# Patient Record
Sex: MALE | Race: WHITE | Employment: PART TIME | URBAN - METROPOLITAN AREA
[De-identification: names, ages, dates, MRNs, and addresses within clinical notes are randomized per-mention and may not be internally consistent; named-entity substitution may affect disease eponyms.]

---

## 2017-01-03 ENCOUNTER — OFFICE VISIT (OUTPATIENT)
Dept: FAMILY MEDICINE CLINIC | Facility: CLINIC | Age: 57
End: 2017-01-03

## 2017-01-03 VITALS
BODY MASS INDEX: 24.55 KG/M2 | TEMPERATURE: 99 F | HEIGHT: 68 IN | WEIGHT: 162 LBS | SYSTOLIC BLOOD PRESSURE: 114 MMHG | RESPIRATION RATE: 22 BRPM | DIASTOLIC BLOOD PRESSURE: 79 MMHG | HEART RATE: 85 BPM

## 2017-01-03 DIAGNOSIS — J06.9 ACUTE URI: ICD-10-CM

## 2017-01-03 PROCEDURE — 99213 OFFICE O/P EST LOW 20 MIN: CPT | Performed by: FAMILY MEDICINE

## 2017-01-03 PROCEDURE — 99212 OFFICE O/P EST SF 10 MIN: CPT | Performed by: FAMILY MEDICINE

## 2017-01-03 RX ORDER — AMOXICILLIN AND CLAVULANATE POTASSIUM 875; 125 MG/1; MG/1
1 TABLET, FILM COATED ORAL 2 TIMES DAILY
Qty: 20 TABLET | Refills: 0 | Status: SHIPPED | OUTPATIENT
Start: 2017-01-03 | End: 2017-03-01

## 2017-01-03 NOTE — PROGRESS NOTES
HPI:    Patient ID: Gerardo Rodrigues is a 55year old male. HPI Comments: Pt presents with cold symptoms for 5 days. Pt has had cough, sore throat. had fevers. Pt has tried otc remedies without relief. Several sick contacts.       Nasal Congestion  This is route daily. Disp: 1 kit Rfl: 0   Vitamins-Lipotropics (MULTI-VITAMIN HP/MINERALS) Oral Cap Take  by mouth.  Disp:  Rfl:      Allergies:  Ciprofloxacin           Hives   PHYSICAL EXAM:   Physical Exam   Constitutional: He appears well-developed and well-nou

## 2017-01-09 ENCOUNTER — OFFICE VISIT (OUTPATIENT)
Dept: SURGERY | Facility: CLINIC | Age: 57
End: 2017-01-09

## 2017-01-09 ENCOUNTER — TELEPHONE (OUTPATIENT)
Dept: FAMILY MEDICINE CLINIC | Facility: CLINIC | Age: 57
End: 2017-01-09

## 2017-01-09 DIAGNOSIS — R10.31 RIGHT INGUINAL PAIN: Primary | ICD-10-CM

## 2017-01-09 DIAGNOSIS — Q44.1 GALLBLADDER ANOMALY: Primary | ICD-10-CM

## 2017-01-09 PROCEDURE — 99213 OFFICE O/P EST LOW 20 MIN: CPT | Performed by: SURGERY

## 2017-01-09 PROCEDURE — 99212 OFFICE O/P EST SF 10 MIN: CPT | Performed by: SURGERY

## 2017-01-09 NOTE — TELEPHONE ENCOUNTER
Patient seen Dr. Deneen Stevens and was told to call Dr Crys Haider re: Melissa Blackman Bladder and 6 mo follow up to see if he should have another US  It has bee six months.

## 2017-01-09 NOTE — TELEPHONE ENCOUNTER
Message noted/ chart reviewed and order for repeat u/s for gallbladder generated and signed. Can inform patient to schedule. COPY OF order left at  also.

## 2017-01-09 NOTE — PROGRESS NOTES
Established Patient Follow-up      1/9/2017    Arina Luis 55year old      HPI  Patient presents with: Follow - Up: Patient s/p right inguinal hernia repair with mesh on 08/17/16.   Patient states has been having intermittent RUQ pain, denies nausea, v

## 2017-01-17 RX ORDER — FLUTICASONE PROPIONATE 50 MCG
SPRAY, SUSPENSION (ML) NASAL
Qty: 1 INHALER | Refills: 5 | Status: SHIPPED | OUTPATIENT
Start: 2017-01-17 | End: 2017-10-11

## 2017-01-17 NOTE — TELEPHONE ENCOUNTER
Message noted: Chart reviewed and may refill medication as requested times one with 5 additional refills. Prescription sent to listed pharmacy. Pharmacy to notify patient.

## 2017-01-18 ENCOUNTER — TELEPHONE (OUTPATIENT)
Dept: FAMILY MEDICINE CLINIC | Facility: CLINIC | Age: 57
End: 2017-01-18

## 2017-01-18 NOTE — TELEPHONE ENCOUNTER
Pt is calling state that his insurance is not covering medication FLUTICASONE PROPIONATE 50 MCG/ACT Nasal Suspension  Pt want to know if there is another medication that Dr TEJADA can prescribe

## 2017-01-19 ENCOUNTER — HOSPITAL ENCOUNTER (OUTPATIENT)
Dept: ULTRASOUND IMAGING | Facility: HOSPITAL | Age: 57
Discharge: HOME | End: 2017-01-19
Attending: FAMILY MEDICINE
Payer: MEDICAID

## 2017-01-19 DIAGNOSIS — Q44.1 GALLBLADDER ANOMALY: ICD-10-CM

## 2017-01-19 PROCEDURE — 76700 US EXAM ABDOM COMPLETE: CPT

## 2017-01-23 ENCOUNTER — TELEPHONE (OUTPATIENT)
Dept: SURGERY | Facility: CLINIC | Age: 57
End: 2017-01-23

## 2017-01-23 NOTE — TELEPHONE ENCOUNTER
As stated in my recent note from 1/9/17, I did not think his gallstones were causing any symptoms.  No polyps were seen. Haley Dos Santos he remains concerned, then f/u in my office in 1-2 months to review options again.          ----- Message -----          From:  Wa

## 2017-02-01 ENCOUNTER — NURSE ONLY (OUTPATIENT)
Dept: ALLERGY | Facility: CLINIC | Age: 57
End: 2017-02-01

## 2017-02-01 DIAGNOSIS — J30.89 ENVIRONMENTAL AND SEASONAL ALLERGIES: Primary | ICD-10-CM

## 2017-02-01 PROCEDURE — 95125 IMMUNOTHERAPY 2/> INJECTIONS: CPT | Performed by: ALLERGY & IMMUNOLOGY

## 2017-02-14 ENCOUNTER — NURSE ONLY (OUTPATIENT)
Dept: ALLERGY | Facility: CLINIC | Age: 57
End: 2017-02-14

## 2017-02-14 DIAGNOSIS — Z91.09 ENVIRONMENTAL ALLERGIES: Primary | ICD-10-CM

## 2017-02-14 PROCEDURE — 95125 IMMUNOTHERAPY 2/> INJECTIONS: CPT | Performed by: ALLERGY & IMMUNOLOGY

## 2017-03-01 ENCOUNTER — OFFICE VISIT (OUTPATIENT)
Dept: FAMILY MEDICINE CLINIC | Facility: CLINIC | Age: 57
End: 2017-03-01

## 2017-03-01 ENCOUNTER — APPOINTMENT (OUTPATIENT)
Dept: LAB | Age: 57
End: 2017-03-01
Attending: FAMILY MEDICINE
Payer: MEDICAID

## 2017-03-01 VITALS
WEIGHT: 156 LBS | HEART RATE: 70 BPM | BODY MASS INDEX: 23.64 KG/M2 | DIASTOLIC BLOOD PRESSURE: 80 MMHG | RESPIRATION RATE: 20 BRPM | HEIGHT: 68 IN | TEMPERATURE: 99 F | SYSTOLIC BLOOD PRESSURE: 115 MMHG

## 2017-03-01 DIAGNOSIS — Z00.00 ROUTINE PHYSICAL EXAMINATION: Primary | ICD-10-CM

## 2017-03-01 DIAGNOSIS — E11.9 CONTROLLED TYPE 2 DIABETES MELLITUS WITHOUT COMPLICATION, WITHOUT LONG-TERM CURRENT USE OF INSULIN (HCC): ICD-10-CM

## 2017-03-01 DIAGNOSIS — Z00.00 ROUTINE PHYSICAL EXAMINATION: ICD-10-CM

## 2017-03-01 LAB
ALBUMIN SERPL BCP-MCNC: 4 G/DL (ref 3.5–4.8)
ALBUMIN/GLOB SERPL: 1.4 {RATIO} (ref 1–2)
ALP SERPL-CCNC: 42 U/L (ref 32–100)
ALT SERPL-CCNC: 24 U/L (ref 17–63)
ANION GAP SERPL CALC-SCNC: 9 MMOL/L (ref 0–18)
AST SERPL-CCNC: 16 U/L (ref 15–41)
BILIRUB SERPL-MCNC: 1 MG/DL (ref 0.3–1.2)
BUN SERPL-MCNC: 16 MG/DL (ref 8–20)
BUN/CREAT SERPL: 20.3 (ref 10–20)
CALCIUM SERPL-MCNC: 9.1 MG/DL (ref 8.5–10.5)
CHLORIDE SERPL-SCNC: 102 MMOL/L (ref 95–110)
CHOLEST SERPL-MCNC: 163 MG/DL (ref 110–200)
CO2 SERPL-SCNC: 30 MMOL/L (ref 22–32)
CREAT SERPL-MCNC: 0.79 MG/DL (ref 0.5–1.5)
CREAT UR-MCNC: 204.6 MG/DL
ERYTHROCYTE [DISTWIDTH] IN BLOOD BY AUTOMATED COUNT: 13.1 % (ref 11–15)
GLOBULIN PLAS-MCNC: 2.8 G/DL (ref 2.5–3.7)
GLUCOSE SERPL-MCNC: 175 MG/DL (ref 70–99)
HBA1C MFR BLD: 6.4 % (ref 4–6)
HCT VFR BLD AUTO: 49.4 % (ref 41–52)
HDLC SERPL-MCNC: 43 MG/DL
HGB BLD-MCNC: 16.4 G/DL (ref 13.5–17.5)
LDLC SERPL CALC-MCNC: 113 MG/DL (ref 0–99)
MCH RBC QN AUTO: 31 PG (ref 27–32)
MCHC RBC AUTO-ENTMCNC: 33.1 G/DL (ref 32–37)
MCV RBC AUTO: 93.6 FL (ref 80–100)
MICROALBUMIN UR-MCNC: 0.3 MG/DL (ref 0–1.8)
MICROALBUMIN/CREAT UR: 1.5 MG/G{CREAT} (ref 0–20)
NONHDLC SERPL-MCNC: 120 MG/DL
OSMOLALITY UR CALC.SUM OF ELEC: 297 MOSM/KG (ref 275–295)
PLATELET # BLD AUTO: 186 K/UL (ref 140–400)
PMV BLD AUTO: 9 FL (ref 7.4–10.3)
POTASSIUM SERPL-SCNC: 4.5 MMOL/L (ref 3.3–5.1)
PROT SERPL-MCNC: 6.8 G/DL (ref 5.9–8.4)
PSA SERPL-MCNC: 0.5 NG/ML (ref 0–4)
RBC # BLD AUTO: 5.28 M/UL (ref 4.5–5.9)
SODIUM SERPL-SCNC: 141 MMOL/L (ref 136–144)
TRIGL SERPL-MCNC: 36 MG/DL (ref 1–149)
WBC # BLD AUTO: 5.8 K/UL (ref 4–11)

## 2017-03-01 PROCEDURE — 36415 COLL VENOUS BLD VENIPUNCTURE: CPT

## 2017-03-01 PROCEDURE — 85027 COMPLETE CBC AUTOMATED: CPT

## 2017-03-01 PROCEDURE — 82570 ASSAY OF URINE CREATININE: CPT

## 2017-03-01 PROCEDURE — 80061 LIPID PANEL: CPT

## 2017-03-01 PROCEDURE — 83036 HEMOGLOBIN GLYCOSYLATED A1C: CPT

## 2017-03-01 PROCEDURE — 99396 PREV VISIT EST AGE 40-64: CPT | Performed by: FAMILY MEDICINE

## 2017-03-01 PROCEDURE — 82043 UR ALBUMIN QUANTITATIVE: CPT

## 2017-03-01 PROCEDURE — 80053 COMPREHEN METABOLIC PANEL: CPT

## 2017-03-01 NOTE — PROGRESS NOTES
HPI:    Patient ID: Zack Mcclellan is a 55year old male. HPI Comments: Patient is here for routine physical exam. No acute issues. Patient is requesting blood testing. Diet and exercise have been fair.  Past medical history, family history, and social h not apply Kit 1 kit by Does not apply route daily. Disp: 1 kit Rfl: 0   Vitamins-Lipotropics (MULTI-VITAMIN HP/MINERALS) Oral Cap Take  by mouth.  Disp:  Rfl:    FLUARIX QUADRIVALENT 0.5 ML Intramuscular Suspension Prefilled Syringe  Disp:  Rfl:    Acetamin medications; To call if problems;  Will check DM labs and follow up and further management after lab work; Routine follow up in 6 months; Encouraged DM eye exam.      Orders Placed This Encounter  Comp Metabolic Panel (14) [E]  Hemoglobin A1C [E]  Lipid Pan

## 2017-03-02 ENCOUNTER — NURSE ONLY (OUTPATIENT)
Dept: ALLERGY | Facility: CLINIC | Age: 57
End: 2017-03-02

## 2017-03-02 DIAGNOSIS — J30.89 ENVIRONMENTAL AND SEASONAL ALLERGIES: Primary | ICD-10-CM

## 2017-03-02 PROCEDURE — 95125 IMMUNOTHERAPY 2/> INJECTIONS: CPT | Performed by: ALLERGY & IMMUNOLOGY

## 2017-03-14 ENCOUNTER — NURSE ONLY (OUTPATIENT)
Dept: ALLERGY | Facility: CLINIC | Age: 57
End: 2017-03-14

## 2017-03-14 DIAGNOSIS — J30.89 ENVIRONMENTAL AND SEASONAL ALLERGIES: Primary | ICD-10-CM

## 2017-03-14 PROCEDURE — 95125 IMMUNOTHERAPY 2/> INJECTIONS: CPT | Performed by: ALLERGY & IMMUNOLOGY

## 2017-03-22 ENCOUNTER — NURSE ONLY (OUTPATIENT)
Dept: ALLERGY | Facility: CLINIC | Age: 57
End: 2017-03-22

## 2017-03-22 DIAGNOSIS — Z91.09 ENVIRONMENTAL ALLERGIES: Primary | ICD-10-CM

## 2017-03-22 PROCEDURE — 95125 IMMUNOTHERAPY 2/> INJECTIONS: CPT | Performed by: ALLERGY & IMMUNOLOGY

## 2017-03-22 RX ORDER — DICYCLOMINE HCL 20 MG
TABLET ORAL
Qty: 90 TABLET | Refills: 0 | Status: SHIPPED | OUTPATIENT
Start: 2017-03-22 | End: 2017-06-21

## 2017-03-29 ENCOUNTER — NURSE ONLY (OUTPATIENT)
Dept: ALLERGY | Facility: CLINIC | Age: 57
End: 2017-03-29

## 2017-03-29 ENCOUNTER — TELEPHONE (OUTPATIENT)
Dept: FAMILY MEDICINE CLINIC | Facility: CLINIC | Age: 57
End: 2017-03-29

## 2017-03-29 DIAGNOSIS — Z91.09 ENVIRONMENTAL ALLERGIES: Primary | ICD-10-CM

## 2017-03-29 DIAGNOSIS — J30.9 ALLERGIC RHINITIS, UNSPECIFIED ALLERGIC RHINITIS TRIGGER, UNSPECIFIED RHINITIS SEASONALITY: Primary | ICD-10-CM

## 2017-03-29 PROCEDURE — 95125 IMMUNOTHERAPY 2/> INJECTIONS: CPT | Performed by: ALLERGY & IMMUNOLOGY

## 2017-04-01 NOTE — TELEPHONE ENCOUNTER
Referral request noted. Referral approved, generated and sent to [facility].

## 2017-04-10 RX ORDER — GLYBURIDE-METFORMIN HYDROCHLORIDE 2.5; 5 MG/1; MG/1
TABLET ORAL
Qty: 30 TABLET | Refills: 11 | Status: SHIPPED | OUTPATIENT
Start: 2017-04-10 | End: 2018-04-05

## 2017-04-10 NOTE — TELEPHONE ENCOUNTER
Message noted: Chart reviewed and may refill medication as requested times one with 11 additional refills. Prescription sent to listed pharmacy. Please notify patient.

## 2017-04-10 NOTE — TELEPHONE ENCOUNTER
Diabetes Medications; protocol failed. Please advise on refill. Thanks.   Protocol Criteria:  · Appointment scheduled in the past 6 months or the next 3 months  · A1C < 7.5 in the past 6 months  · Creatinine in the past 12 months  · Creatinine result < 1.5

## 2017-04-12 ENCOUNTER — NURSE ONLY (OUTPATIENT)
Dept: ALLERGY | Facility: CLINIC | Age: 57
End: 2017-04-12

## 2017-04-12 ENCOUNTER — TELEPHONE (OUTPATIENT)
Dept: FAMILY MEDICINE CLINIC | Facility: CLINIC | Age: 57
End: 2017-04-12

## 2017-04-12 ENCOUNTER — OFFICE VISIT (OUTPATIENT)
Dept: OTOLARYNGOLOGY | Facility: CLINIC | Age: 57
End: 2017-04-12

## 2017-04-12 ENCOUNTER — OFFICE VISIT (OUTPATIENT)
Dept: ALLERGY | Facility: CLINIC | Age: 57
End: 2017-04-12

## 2017-04-12 VITALS
WEIGHT: 155 LBS | HEIGHT: 68 IN | SYSTOLIC BLOOD PRESSURE: 123 MMHG | BODY MASS INDEX: 23.49 KG/M2 | DIASTOLIC BLOOD PRESSURE: 79 MMHG | TEMPERATURE: 98 F

## 2017-04-12 VITALS
SYSTOLIC BLOOD PRESSURE: 120 MMHG | WEIGHT: 155 LBS | HEIGHT: 68 IN | RESPIRATION RATE: 16 BRPM | DIASTOLIC BLOOD PRESSURE: 78 MMHG | BODY MASS INDEX: 23.49 KG/M2 | TEMPERATURE: 98 F | HEART RATE: 70 BPM

## 2017-04-12 DIAGNOSIS — H61.23 BILATERAL IMPACTED CERUMEN: ICD-10-CM

## 2017-04-12 DIAGNOSIS — Z91.048 ALLERGY TO MOLD: ICD-10-CM

## 2017-04-12 DIAGNOSIS — J30.1 SEASONAL ALLERGIC RHINITIS DUE TO POLLEN: ICD-10-CM

## 2017-04-12 DIAGNOSIS — H61.23 BILATERAL IMPACTED CERUMEN: Primary | ICD-10-CM

## 2017-04-12 DIAGNOSIS — J30.89 ENVIRONMENTAL AND SEASONAL ALLERGIES: Primary | ICD-10-CM

## 2017-04-12 DIAGNOSIS — H61.20 IMPACTED CERUMEN, UNSPECIFIED LATERALITY: Primary | ICD-10-CM

## 2017-04-12 DIAGNOSIS — J30.81 ALLERGIC RHINITIS DUE TO ANIMAL (CAT) (DOG) HAIR AND DANDER: Primary | ICD-10-CM

## 2017-04-12 DIAGNOSIS — Z91.09 ALLERGY TO AMERICAN HOUSE DUST MITE: ICD-10-CM

## 2017-04-12 PROCEDURE — 99214 OFFICE O/P EST MOD 30 MIN: CPT | Performed by: ALLERGY & IMMUNOLOGY

## 2017-04-12 PROCEDURE — 99212 OFFICE O/P EST SF 10 MIN: CPT | Performed by: OTOLARYNGOLOGY

## 2017-04-12 PROCEDURE — 99212 OFFICE O/P EST SF 10 MIN: CPT | Performed by: ALLERGY & IMMUNOLOGY

## 2017-04-12 PROCEDURE — 69210 REMOVE IMPACTED EAR WAX UNI: CPT | Performed by: ALLERGY & IMMUNOLOGY

## 2017-04-12 PROCEDURE — 95125 IMMUNOTHERAPY 2/> INJECTIONS: CPT | Performed by: ALLERGY & IMMUNOLOGY

## 2017-04-12 PROCEDURE — 99203 OFFICE O/P NEW LOW 30 MIN: CPT | Performed by: OTOLARYNGOLOGY

## 2017-04-12 PROCEDURE — 69210 REMOVE IMPACTED EAR WAX UNI: CPT | Performed by: OTOLARYNGOLOGY

## 2017-04-12 NOTE — TELEPHONE ENCOUNTER
Needs a referral to see ENT to have his ears cleaned. He just saw Dr. Colletta Leach and he couldn't get it cleared.

## 2017-04-12 NOTE — PROGRESS NOTES
Audi Quevedo is a 55year old male. HPI:   Patient presents with: Allergies: recent ear congestion, possibly wax. Otherwise feels well. Patient is a 16-year-old male who presents for follow-up with a chief complaint of allergies.     Patient last Comment: social        Medications (Active prior to today's visit):    Current Outpatient Prescriptions:  GLYBURIDE-METFORMIN 2.5-500 MG Oral Tab TAKE 1 TABLET BY MOUTH DAILY WITH BREAKFAST Disp: 30 tablet Rfl: 11   DICYCLOMINE HCL 20 MG Oral Tab TAKE 1 TA mucous membranes are moist   Neck/Thyroid: neck is supple without adenopathy  Lymphatic: no abnormal cervical, supraclavicular or axillary adenopathy is noted  Respiratory: normal to inspection lungs are clear to auscultation bilaterally normal respiratory method

## 2017-04-12 NOTE — PROGRESS NOTES
Samuel Angeles is a 55year old male. Patient presents with:  Ear Wax: bilateral ear cleaning         HISTORY OF PRESENT ILLNESS     4/12/2017Here for evaluation of  bilateral hearing loss.  Patient feels this has worsened over the las month and  is not  as changes. Respiratory Negative Dyspnea and wheezing. Cardio Negative Chest pain, irregular heartbeat   GI Negative Abdominal pain and diarrhea. Endocrine Negative Cold intolerance and heat intolerance. Neuro Negative Tremors.    Psych Negative Laurelyn Pour Inhaler, Rfl: 5  •  TRUEPLUS LANCETS 28G Does not apply Misc, USE AS DIRECTED ONCE DAILY, Disp: 100 each, Rfl: 3  •  TRUE METRIX BLOOD GLUCOSE TEST In Vitro Strip, USE ONE STRIP DAILY FOR BLOOD GLUCOSE MONITORING, Disp: 100 strip, Rfl: 4  •  Acetaminophen

## 2017-04-12 NOTE — TELEPHONE ENCOUNTER
Referral request noted. Referral approved, generated and sent to [facility].

## 2017-04-26 ENCOUNTER — NURSE ONLY (OUTPATIENT)
Dept: ALLERGY | Facility: CLINIC | Age: 57
End: 2017-04-26

## 2017-04-26 DIAGNOSIS — Z91.09 ENVIRONMENTAL ALLERGIES: Primary | ICD-10-CM

## 2017-04-26 PROCEDURE — 95125 IMMUNOTHERAPY 2/> INJECTIONS: CPT | Performed by: ALLERGY & IMMUNOLOGY

## 2017-05-10 ENCOUNTER — NURSE ONLY (OUTPATIENT)
Dept: ALLERGY | Facility: CLINIC | Age: 57
End: 2017-05-10

## 2017-05-10 DIAGNOSIS — J30.89 ENVIRONMENTAL AND SEASONAL ALLERGIES: Primary | ICD-10-CM

## 2017-05-10 PROCEDURE — 95125 IMMUNOTHERAPY 2/> INJECTIONS: CPT | Performed by: ALLERGY & IMMUNOLOGY

## 2017-05-24 ENCOUNTER — NURSE ONLY (OUTPATIENT)
Dept: ALLERGY | Facility: CLINIC | Age: 57
End: 2017-05-24

## 2017-05-24 DIAGNOSIS — Z91.09 ENVIRONMENTAL ALLERGIES: Primary | ICD-10-CM

## 2017-05-24 PROCEDURE — 95125 IMMUNOTHERAPY 2/> INJECTIONS: CPT | Performed by: ALLERGY & IMMUNOLOGY

## 2017-06-12 RX ORDER — GLUCOSAM/CHON-MSM1/C/MANG/BOSW 500-416.6
TABLET ORAL
Qty: 100 EACH | Refills: 11 | Status: SHIPPED | OUTPATIENT
Start: 2017-06-12 | End: 2018-06-25

## 2017-06-12 NOTE — TELEPHONE ENCOUNTER
Message noted: Chart reviewed and may refill DM supplies as requested times one with 11 additional refills. Prescription sent to listed pharmacy. Pharmacy to notify patient.

## 2017-06-14 ENCOUNTER — NURSE ONLY (OUTPATIENT)
Dept: ALLERGY | Facility: CLINIC | Age: 57
End: 2017-06-14

## 2017-06-14 DIAGNOSIS — J30.89 ENVIRONMENTAL AND SEASONAL ALLERGIES: Primary | ICD-10-CM

## 2017-06-14 PROCEDURE — 95125 IMMUNOTHERAPY 2/> INJECTIONS: CPT | Performed by: ALLERGY & IMMUNOLOGY

## 2017-06-20 NOTE — TELEPHONE ENCOUNTER
Current Outpatient Prescriptions:  DICYCLOMINE HCL 20 MG Oral Tab TAKE 1 TABLET(20 MG) BY MOUTH THREE TIMES DAILY Disp: 90 tablet Rfl: 0

## 2017-06-21 RX ORDER — DICYCLOMINE HCL 20 MG
TABLET ORAL
Qty: 90 TABLET | Refills: 0 | Status: SHIPPED | OUTPATIENT
Start: 2017-06-21 | End: 2017-09-19

## 2017-07-05 ENCOUNTER — NURSE ONLY (OUTPATIENT)
Dept: ALLERGY | Facility: CLINIC | Age: 57
End: 2017-07-05

## 2017-07-05 DIAGNOSIS — J30.89 ENVIRONMENTAL AND SEASONAL ALLERGIES: ICD-10-CM

## 2017-07-05 PROCEDURE — 95125 IMMUNOTHERAPY 2/> INJECTIONS: CPT | Performed by: ALLERGY & IMMUNOLOGY

## 2017-07-18 RX ORDER — CALCIUM CITRATE/VITAMIN D3 200MG-6.25
TABLET ORAL
Qty: 100 STRIP | Refills: 5 | Status: SHIPPED | OUTPATIENT
Start: 2017-07-18 | End: 2018-08-06

## 2017-07-18 NOTE — TELEPHONE ENCOUNTER
Message noted: Chart reviewed and may refill diabetic supplies as requested times one with 5 additional refills. Prescription sent to listed pharmacy. Pharmacy to notify patient.

## 2017-08-02 ENCOUNTER — NURSE ONLY (OUTPATIENT)
Dept: ALLERGY | Facility: CLINIC | Age: 57
End: 2017-08-02

## 2017-08-02 DIAGNOSIS — J30.89 ENVIRONMENTAL AND SEASONAL ALLERGIES: ICD-10-CM

## 2017-08-02 PROCEDURE — 95125 IMMUNOTHERAPY 2/> INJECTIONS: CPT | Performed by: ALLERGY & IMMUNOLOGY

## 2017-08-30 ENCOUNTER — NURSE ONLY (OUTPATIENT)
Dept: ALLERGY | Facility: CLINIC | Age: 57
End: 2017-08-30

## 2017-08-30 DIAGNOSIS — J30.89 ENVIRONMENTAL AND SEASONAL ALLERGIES: ICD-10-CM

## 2017-08-30 PROCEDURE — 95165 ANTIGEN THERAPY SERVICES: CPT | Performed by: ALLERGY & IMMUNOLOGY

## 2017-08-30 PROCEDURE — 95117 IMMUNOTHERAPY INJECTIONS: CPT | Performed by: ALLERGY & IMMUNOLOGY

## 2017-09-07 ENCOUNTER — OFFICE VISIT (OUTPATIENT)
Dept: FAMILY MEDICINE CLINIC | Facility: CLINIC | Age: 57
End: 2017-09-07

## 2017-09-07 VITALS
BODY MASS INDEX: 23.79 KG/M2 | TEMPERATURE: 98 F | HEIGHT: 68 IN | WEIGHT: 157 LBS | RESPIRATION RATE: 18 BRPM | HEART RATE: 75 BPM | DIASTOLIC BLOOD PRESSURE: 78 MMHG | SYSTOLIC BLOOD PRESSURE: 120 MMHG

## 2017-09-07 DIAGNOSIS — J06.9 ACUTE URI: ICD-10-CM

## 2017-09-07 DIAGNOSIS — E11.9 CONTROLLED TYPE 2 DIABETES MELLITUS WITHOUT COMPLICATION, UNSPECIFIED LONG TERM INSULIN USE STATUS: ICD-10-CM

## 2017-09-07 PROCEDURE — 99214 OFFICE O/P EST MOD 30 MIN: CPT | Performed by: FAMILY MEDICINE

## 2017-09-07 PROCEDURE — 99212 OFFICE O/P EST SF 10 MIN: CPT | Performed by: FAMILY MEDICINE

## 2017-09-07 RX ORDER — AMOXICILLIN AND CLAVULANATE POTASSIUM 875; 125 MG/1; MG/1
1 TABLET, FILM COATED ORAL 2 TIMES DAILY
Qty: 20 TABLET | Refills: 0 | Status: SHIPPED | OUTPATIENT
Start: 2017-09-07 | End: 2017-09-19

## 2017-09-07 NOTE — PROGRESS NOTES
HPI:    Patient ID: Audi Quevedo is a 65year old male. Patient is here for follow up for chronic medical issues- diabetes. The patient is compliant with medications and no side effects. There are no acute issues and patient is requesting refills.  The by mouth. Disp:  Rfl:      Allergies:  Ciprofloxacin           Hives   PHYSICAL EXAM:   Physical Exam   Constitutional: He appears well-developed and well-nourished.    HENT:   Right Ear: Tympanic membrane and ear canal normal.   Left Ear: Tympanic membran

## 2017-09-19 ENCOUNTER — OFFICE VISIT (OUTPATIENT)
Dept: FAMILY MEDICINE CLINIC | Facility: CLINIC | Age: 57
End: 2017-09-19

## 2017-09-19 VITALS
RESPIRATION RATE: 16 BRPM | WEIGHT: 157 LBS | DIASTOLIC BLOOD PRESSURE: 80 MMHG | BODY MASS INDEX: 23.79 KG/M2 | TEMPERATURE: 98 F | SYSTOLIC BLOOD PRESSURE: 123 MMHG | HEART RATE: 72 BPM | HEIGHT: 68 IN

## 2017-09-19 DIAGNOSIS — J06.9 ACUTE URI: Primary | ICD-10-CM

## 2017-09-19 PROCEDURE — 99212 OFFICE O/P EST SF 10 MIN: CPT | Performed by: FAMILY MEDICINE

## 2017-09-19 PROCEDURE — 99213 OFFICE O/P EST LOW 20 MIN: CPT | Performed by: FAMILY MEDICINE

## 2017-09-19 RX ORDER — CODEINE PHOSPHATE AND GUAIFENESIN 10; 100 MG/5ML; MG/5ML
5 SOLUTION ORAL EVERY 6 HOURS PRN
Qty: 140 ML | Refills: 0 | Status: SHIPPED | OUTPATIENT
Start: 2017-09-19 | End: 2017-12-19

## 2017-09-19 RX ORDER — AZITHROMYCIN 250 MG/1
TABLET, FILM COATED ORAL
Qty: 6 TABLET | Refills: 0 | Status: SHIPPED | OUTPATIENT
Start: 2017-09-19 | End: 2017-12-19

## 2017-09-19 NOTE — PROGRESS NOTES
HPI:    Patient ID: Zack Mcclellan is a 65year old male. Pt presents with cold symptoms for 1-2 weeks. Pt has had persistent cough, sore throat. Has had some fevers. Pt has tried otc remedies without relief. Pt states no sick contacts.  Pt was on augmen HP/MINERALS) Oral Cap Take  by mouth. Disp:  Rfl:      Allergies:  Ciprofloxacin           Hives   PHYSICAL EXAM:   Physical Exam   Constitutional: He appears well-developed and well-nourished.    HENT:   Right Ear: Tympanic membrane and ear canal normal.

## 2017-09-20 RX ORDER — DICYCLOMINE HCL 20 MG
TABLET ORAL
Qty: 90 TABLET | Refills: 0 | Status: SHIPPED | OUTPATIENT
Start: 2017-09-20 | End: 2017-12-19

## 2017-09-23 ENCOUNTER — APPOINTMENT (OUTPATIENT)
Dept: LAB | Age: 57
End: 2017-09-23
Attending: FAMILY MEDICINE
Payer: COMMERCIAL

## 2017-09-23 DIAGNOSIS — E11.9 CONTROLLED TYPE 2 DIABETES MELLITUS WITHOUT COMPLICATION, UNSPECIFIED LONG TERM INSULIN USE STATUS: ICD-10-CM

## 2017-09-23 LAB
ALBUMIN SERPL BCP-MCNC: 3.9 G/DL (ref 3.5–4.8)
ALBUMIN/GLOB SERPL: 1.4 {RATIO} (ref 1–2)
ALP SERPL-CCNC: 40 U/L (ref 32–100)
ALT SERPL-CCNC: 24 U/L (ref 17–63)
ANION GAP SERPL CALC-SCNC: 7 MMOL/L (ref 0–18)
AST SERPL-CCNC: 16 U/L (ref 15–41)
BILIRUB SERPL-MCNC: 0.9 MG/DL (ref 0.3–1.2)
BUN SERPL-MCNC: 11 MG/DL (ref 8–20)
BUN/CREAT SERPL: 14.5 (ref 10–20)
CALCIUM SERPL-MCNC: 8.8 MG/DL (ref 8.5–10.5)
CHLORIDE SERPL-SCNC: 106 MMOL/L (ref 95–110)
CHOLEST SERPL-MCNC: 162 MG/DL (ref 110–200)
CO2 SERPL-SCNC: 28 MMOL/L (ref 22–32)
CREAT SERPL-MCNC: 0.76 MG/DL (ref 0.5–1.5)
GLOBULIN PLAS-MCNC: 2.7 G/DL (ref 2.5–3.7)
GLUCOSE SERPL-MCNC: 159 MG/DL (ref 70–99)
HDLC SERPL-MCNC: 44 MG/DL
LDLC SERPL CALC-MCNC: 107 MG/DL (ref 0–99)
NONHDLC SERPL-MCNC: 118 MG/DL
OSMOLALITY UR CALC.SUM OF ELEC: 295 MOSM/KG (ref 275–295)
POTASSIUM SERPL-SCNC: 4.1 MMOL/L (ref 3.3–5.1)
PROT SERPL-MCNC: 6.6 G/DL (ref 5.9–8.4)
SODIUM SERPL-SCNC: 141 MMOL/L (ref 136–144)
TRIGL SERPL-MCNC: 54 MG/DL (ref 1–149)

## 2017-09-23 PROCEDURE — 83036 HEMOGLOBIN GLYCOSYLATED A1C: CPT

## 2017-09-23 PROCEDURE — 80053 COMPREHEN METABOLIC PANEL: CPT

## 2017-09-23 PROCEDURE — 80061 LIPID PANEL: CPT

## 2017-09-23 PROCEDURE — 36415 COLL VENOUS BLD VENIPUNCTURE: CPT

## 2017-09-24 LAB — HBA1C MFR BLD: 6.5 % (ref 4–6)

## 2017-09-27 ENCOUNTER — NURSE ONLY (OUTPATIENT)
Dept: ALLERGY | Facility: CLINIC | Age: 57
End: 2017-09-27

## 2017-09-27 DIAGNOSIS — J30.89 ENVIRONMENTAL AND SEASONAL ALLERGIES: ICD-10-CM

## 2017-09-27 PROCEDURE — 95117 IMMUNOTHERAPY INJECTIONS: CPT | Performed by: ALLERGY & IMMUNOLOGY

## 2017-10-12 RX ORDER — FLUTICASONE PROPIONATE 50 MCG
SPRAY, SUSPENSION (ML) NASAL
Qty: 1 BOTTLE | Refills: 2 | Status: SHIPPED | OUTPATIENT
Start: 2017-10-12 | End: 2018-02-21

## 2017-10-12 NOTE — TELEPHONE ENCOUNTER
Refill Protocol Appointment Criteria: Refilled per protocol    · Appointment scheduled in the past 12 months or in the next 3 months  Recent Outpatient Visits            2 weeks ago Environmental and seasonal allergies    [facility] [address] [facility]

## 2017-10-25 ENCOUNTER — NURSE ONLY (OUTPATIENT)
Dept: ALLERGY | Facility: CLINIC | Age: 57
End: 2017-10-25

## 2017-10-25 DIAGNOSIS — J30.89 ENVIRONMENTAL AND SEASONAL ALLERGIES: ICD-10-CM

## 2017-10-25 PROCEDURE — 95117 IMMUNOTHERAPY INJECTIONS: CPT | Performed by: ALLERGY & IMMUNOLOGY

## 2017-11-21 ENCOUNTER — NURSE ONLY (OUTPATIENT)
Dept: ALLERGY | Facility: CLINIC | Age: 57
End: 2017-11-21

## 2017-11-21 DIAGNOSIS — J30.89 ENVIRONMENTAL AND SEASONAL ALLERGIES: ICD-10-CM

## 2017-11-21 PROCEDURE — 95117 IMMUNOTHERAPY INJECTIONS: CPT | Performed by: ALLERGY & IMMUNOLOGY

## 2017-12-19 ENCOUNTER — OFFICE VISIT (OUTPATIENT)
Dept: FAMILY MEDICINE CLINIC | Facility: CLINIC | Age: 57
End: 2017-12-19

## 2017-12-19 VITALS
HEIGHT: 68 IN | DIASTOLIC BLOOD PRESSURE: 75 MMHG | WEIGHT: 158 LBS | RESPIRATION RATE: 20 BRPM | BODY MASS INDEX: 23.95 KG/M2 | SYSTOLIC BLOOD PRESSURE: 119 MMHG | TEMPERATURE: 98 F | HEART RATE: 82 BPM

## 2017-12-19 DIAGNOSIS — J06.9 ACUTE URI: ICD-10-CM

## 2017-12-19 PROCEDURE — 99213 OFFICE O/P EST LOW 20 MIN: CPT | Performed by: FAMILY MEDICINE

## 2017-12-19 PROCEDURE — 99212 OFFICE O/P EST SF 10 MIN: CPT | Performed by: FAMILY MEDICINE

## 2017-12-19 RX ORDER — DICYCLOMINE HCL 20 MG
TABLET ORAL
Qty: 90 TABLET | Refills: 0 | Status: SHIPPED | OUTPATIENT
Start: 2017-12-19 | End: 2018-03-22

## 2017-12-19 RX ORDER — AMOXICILLIN AND CLAVULANATE POTASSIUM 875; 125 MG/1; MG/1
1 TABLET, FILM COATED ORAL 2 TIMES DAILY
Qty: 20 TABLET | Refills: 0 | Status: SHIPPED | OUTPATIENT
Start: 2017-12-19 | End: 2018-02-26

## 2017-12-19 RX ORDER — CODEINE PHOSPHATE AND GUAIFENESIN 10; 100 MG/5ML; MG/5ML
5 SOLUTION ORAL EVERY 6 HOURS PRN
Qty: 140 ML | Refills: 0 | Status: SHIPPED | OUTPATIENT
Start: 2017-12-19 | End: 2018-02-26

## 2017-12-19 NOTE — TELEPHONE ENCOUNTER
Pending Prescriptions Disp Refills    DICYCLOMINE HCL 20 MG Oral Tab [Pharmacy Med Name: DICYCLOMINE 20MG TABLETS] 90 tablet 0     Sig: TAKE 1 TABLET(20 MG) BY MOUTH THREE TIMES DAILY         See refill request  Patient last seen 7-19-16.    Medication last refilled 9-20-17

## 2017-12-19 NOTE — PROGRESS NOTES
HPI:    Patient ID: Irma Franco is a 65year old male. Pt presents with sinus symptoms for 2-3 days. Pt has had some cough, post nasal drainage, chest congestion, sinus pressure and drainage. No sore throat or ear symptoms. No fevers.  Pt has tried ot apply Kit 1 kit by Does not apply route daily. Disp: 1 kit Rfl: 0   Vitamins-Lipotropics (MULTI-VITAMIN HP/MINERALS) Oral Cap Take  by mouth.  Disp:  Rfl:      Allergies:  Ciprofloxacin           Hives   PHYSICAL EXAM:   Physical Exam   Constitutional: He a

## 2017-12-19 NOTE — TELEPHONE ENCOUNTER
The pt needs to follow up in the office - I have not seen in over 1 year. No further refills after this until seen in the office.    RN to inform and assist in setting up appointment

## 2017-12-20 ENCOUNTER — TELEPHONE (OUTPATIENT)
Dept: FAMILY MEDICINE CLINIC | Facility: CLINIC | Age: 57
End: 2017-12-20

## 2017-12-20 NOTE — TELEPHONE ENCOUNTER
Please up date pt's chart.      Per pt's mychart message      Pneumococcal Ppsv23 Medium Risk Adult      Comments:   Already had it at [facility]

## 2017-12-20 NOTE — TELEPHONE ENCOUNTER
Contacted patient regarding message below, patient states is asking to update flu vaccine not pneumococcal ppsv23. Per patient had it done on 09/2017. I reconciled outside immunizations, flu shot 09/25/2017.  Informed patient chart updated, verbalized under

## 2017-12-22 ENCOUNTER — TELEPHONE (OUTPATIENT)
Dept: OTHER | Age: 57
End: 2017-12-22

## 2017-12-22 NOTE — TELEPHONE ENCOUNTER
Pt called concerned about his BS being high now low,LOV 12/19/17 Dx URI-advised increased  BS happens with illness,today BS dropped to 84,asked what he ate only oatmeal and later an omelet advised to eat a peanut butter sandwich and a balance meal for dinn

## 2017-12-26 ENCOUNTER — NURSE ONLY (OUTPATIENT)
Dept: ALLERGY | Facility: CLINIC | Age: 57
End: 2017-12-26

## 2017-12-26 DIAGNOSIS — J30.89 ENVIRONMENTAL AND SEASONAL ALLERGIES: ICD-10-CM

## 2017-12-26 PROCEDURE — 95117 IMMUNOTHERAPY INJECTIONS: CPT | Performed by: ALLERGY & IMMUNOLOGY

## 2017-12-27 ENCOUNTER — TELEPHONE (OUTPATIENT)
Dept: FAMILY MEDICINE CLINIC | Facility: CLINIC | Age: 57
End: 2017-12-27

## 2017-12-27 NOTE — TELEPHONE ENCOUNTER
Answered questions and no sig weight loss over the last year.  Pt has been eating better and also pt's DM is well controlled; hgba1c is 6.5

## 2017-12-27 NOTE — TELEPHONE ENCOUNTER
Pt would like to know what his weight was at the last office visit. Pt would like to speak with Dr. Carol Castillo directly and has questions about diabetes.

## 2018-01-17 ENCOUNTER — NURSE ONLY (OUTPATIENT)
Dept: ALLERGY | Facility: CLINIC | Age: 58
End: 2018-01-17

## 2018-01-17 DIAGNOSIS — J30.1 ALLERGIC RHINITIS DUE TO POLLEN, UNSPECIFIED SEASONALITY: ICD-10-CM

## 2018-01-17 PROCEDURE — 95117 IMMUNOTHERAPY INJECTIONS: CPT | Performed by: ALLERGY & IMMUNOLOGY

## 2018-02-14 ENCOUNTER — NURSE ONLY (OUTPATIENT)
Dept: ALLERGY | Facility: CLINIC | Age: 58
End: 2018-02-14

## 2018-02-14 DIAGNOSIS — J30.1 ALLERGIC RHINITIS DUE TO POLLEN, UNSPECIFIED SEASONALITY: ICD-10-CM

## 2018-02-14 PROCEDURE — 95117 IMMUNOTHERAPY INJECTIONS: CPT | Performed by: ALLERGY & IMMUNOLOGY

## 2018-02-22 RX ORDER — FLUTICASONE PROPIONATE 50 MCG
SPRAY, SUSPENSION (ML) NASAL
Qty: 1 INHALER | Refills: 5 | Status: SHIPPED | OUTPATIENT
Start: 2018-02-22 | End: 2018-05-09

## 2018-02-26 ENCOUNTER — OFFICE VISIT (OUTPATIENT)
Dept: FAMILY MEDICINE CLINIC | Facility: CLINIC | Age: 58
End: 2018-02-26

## 2018-02-26 VITALS
BODY MASS INDEX: 24.55 KG/M2 | SYSTOLIC BLOOD PRESSURE: 114 MMHG | HEIGHT: 68 IN | HEART RATE: 78 BPM | DIASTOLIC BLOOD PRESSURE: 73 MMHG | WEIGHT: 162 LBS

## 2018-02-26 DIAGNOSIS — Z00.00 ROUTINE PHYSICAL EXAMINATION: Primary | ICD-10-CM

## 2018-02-26 DIAGNOSIS — E11.9 TYPE 2 DIABETES MELLITUS WITHOUT COMPLICATION, UNSPECIFIED LONG TERM INSULIN USE STATUS: ICD-10-CM

## 2018-02-26 PROCEDURE — 99396 PREV VISIT EST AGE 40-64: CPT | Performed by: FAMILY MEDICINE

## 2018-02-26 NOTE — PROGRESS NOTES
HPI:    Patient ID: Tessie Melara is a 65year old male. Patient is here for routine physical exam and follow up on chronic medical issues. No acute issues. Patient is requesting blood testing. Diet and exercise have been fair.  Past medical history, fa METER) W/DEVICE Does not apply Kit 1 kit by Does not apply route daily. Disp: 1 kit Rfl: 0   Vitamins-Lipotropics (MULTI-VITAMIN HP/MINERALS) Oral Cap Take  by mouth.  Disp:  Rfl:      Allergies:  Ciprofloxacin           Hives   PHYSICAL EXAM:   Physical Ex Has appt in March.         Orders Placed This Encounter      Comp Metabolic Panel (14) [E]      Lipid Panel [E]      Microalb/Creat Ratio, Random Urine [E]      Hemoglobin A1C [E]      CBC, Platelet, No Differential [E]      PSA (Screening) [E]    Meds This

## 2018-03-02 ENCOUNTER — APPOINTMENT (OUTPATIENT)
Dept: LAB | Age: 58
End: 2018-03-02
Attending: FAMILY MEDICINE
Payer: COMMERCIAL

## 2018-03-02 DIAGNOSIS — E11.9 TYPE 2 DIABETES MELLITUS WITHOUT COMPLICATION, UNSPECIFIED LONG TERM INSULIN USE STATUS: ICD-10-CM

## 2018-03-02 DIAGNOSIS — Z00.00 ROUTINE PHYSICAL EXAMINATION: ICD-10-CM

## 2018-03-02 LAB
ALBUMIN SERPL BCP-MCNC: 4 G/DL (ref 3.5–4.8)
ALBUMIN/GLOB SERPL: 1.5 {RATIO} (ref 1–2)
ALP SERPL-CCNC: 43 U/L (ref 32–100)
ALT SERPL-CCNC: 30 U/L (ref 17–63)
ANION GAP SERPL CALC-SCNC: 8 MMOL/L (ref 0–18)
AST SERPL-CCNC: 18 U/L (ref 15–41)
BILIRUB SERPL-MCNC: 0.8 MG/DL (ref 0.3–1.2)
BUN SERPL-MCNC: 13 MG/DL (ref 8–20)
BUN/CREAT SERPL: 21.7 (ref 10–20)
CALCIUM SERPL-MCNC: 8.9 MG/DL (ref 8.5–10.5)
CHLORIDE SERPL-SCNC: 103 MMOL/L (ref 95–110)
CHOLEST SERPL-MCNC: 155 MG/DL (ref 110–200)
CO2 SERPL-SCNC: 29 MMOL/L (ref 22–32)
CREAT SERPL-MCNC: 0.6 MG/DL (ref 0.5–1.5)
CREAT UR-MCNC: 125.8 MG/DL
ERYTHROCYTE [DISTWIDTH] IN BLOOD BY AUTOMATED COUNT: 12.6 % (ref 11–15)
GLOBULIN PLAS-MCNC: 2.6 G/DL (ref 2.5–3.7)
GLUCOSE SERPL-MCNC: 163 MG/DL (ref 70–99)
HBA1C MFR BLD: 6.8 % (ref 4–6)
HCT VFR BLD AUTO: 47.3 % (ref 41–52)
HDLC SERPL-MCNC: 44 MG/DL
HGB BLD-MCNC: 16.2 G/DL (ref 13.5–17.5)
LDLC SERPL CALC-MCNC: 103 MG/DL (ref 0–99)
MCH RBC QN AUTO: 31.6 PG (ref 27–32)
MCHC RBC AUTO-ENTMCNC: 34.1 G/DL (ref 32–37)
MCV RBC AUTO: 92.5 FL (ref 80–100)
MICROALBUMIN UR-MCNC: 1.2 MG/DL (ref 0–1.8)
MICROALBUMIN/CREAT UR: 9.5 MG/G{CREAT} (ref 0–20)
NONHDLC SERPL-MCNC: 111 MG/DL
OSMOLALITY UR CALC.SUM OF ELEC: 294 MOSM/KG (ref 275–295)
PATIENT FASTING: YES
PLATELET # BLD AUTO: 175 K/UL (ref 140–400)
PMV BLD AUTO: 8.4 FL (ref 7.4–10.3)
POTASSIUM SERPL-SCNC: 4.3 MMOL/L (ref 3.3–5.1)
PROT SERPL-MCNC: 6.6 G/DL (ref 5.9–8.4)
PSA SERPL-MCNC: 0.4 NG/ML (ref 0–4)
RBC # BLD AUTO: 5.12 M/UL (ref 4.5–5.9)
SODIUM SERPL-SCNC: 140 MMOL/L (ref 136–144)
TRIGL SERPL-MCNC: 41 MG/DL (ref 1–149)
WBC # BLD AUTO: 5 K/UL (ref 4–11)

## 2018-03-02 PROCEDURE — 82570 ASSAY OF URINE CREATININE: CPT

## 2018-03-02 PROCEDURE — 80053 COMPREHEN METABOLIC PANEL: CPT

## 2018-03-02 PROCEDURE — 80061 LIPID PANEL: CPT

## 2018-03-02 PROCEDURE — 36415 COLL VENOUS BLD VENIPUNCTURE: CPT

## 2018-03-02 PROCEDURE — 82043 UR ALBUMIN QUANTITATIVE: CPT

## 2018-03-02 PROCEDURE — 83036 HEMOGLOBIN GLYCOSYLATED A1C: CPT

## 2018-03-02 PROCEDURE — 85027 COMPLETE CBC AUTOMATED: CPT

## 2018-03-14 ENCOUNTER — NURSE ONLY (OUTPATIENT)
Dept: ALLERGY | Facility: CLINIC | Age: 58
End: 2018-03-14

## 2018-03-14 DIAGNOSIS — J30.89 ENVIRONMENTAL AND SEASONAL ALLERGIES: ICD-10-CM

## 2018-03-14 PROCEDURE — 95117 IMMUNOTHERAPY INJECTIONS: CPT | Performed by: ALLERGY & IMMUNOLOGY

## 2018-03-20 NOTE — TELEPHONE ENCOUNTER
Pending Prescriptions Disp Refills    DICYCLOMINE HCL 20 MG Oral Tab [Pharmacy Med Name: DICYCLOMINE 20MG TABLETS] 90 tablet 0     Sig: TAKE 1 TABLET(20 MG) BY MOUTH THREE TIMES DAILY         See refill request  Patient last seen 7-19-16.    Medication la

## 2018-03-21 RX ORDER — DICYCLOMINE HCL 20 MG
TABLET ORAL
Qty: 90 TABLET | Refills: 0 | OUTPATIENT
Start: 2018-03-21

## 2018-03-21 NOTE — TELEPHONE ENCOUNTER
I have not seen the pt in 1.5 years- see earlier communication on my refill policy. He can go through his primary doctor for this if he likes.

## 2018-03-22 RX ORDER — DICYCLOMINE HCL 20 MG
TABLET ORAL
Qty: 90 TABLET | Refills: 0 | Status: SHIPPED | OUTPATIENT
Start: 2018-03-22 | End: 2018-06-18

## 2018-03-22 NOTE — TELEPHONE ENCOUNTER
LR 12/19/17 from Dr. Anastasiya Graves (see 3/20/18 refill request encounter below)    LOV 2/26/18    Med pended    Please advise    Refill     3/20/2018  [facility], [facility], [address] [place] [address], Rose Avilez MD   GASTROENTEROLOGY   Refill Request   Reason

## 2018-03-22 NOTE — TELEPHONE ENCOUNTER
Current Outpatient Prescriptions:  DICYCLOMINE HCL 20 MG Oral Tab TAKE 1 TABLET(20 MG) BY MOUTH THREE TIMES DAILY Disp: 90 tablet Rfl: 0     New rx for Dr Omid Crocker   said Dr Jeffery Louis advised him to request from Dr Omid Crocker

## 2018-03-22 NOTE — TELEPHONE ENCOUNTER
Letter mailed to patient notifying him that no refills will be given until he makes an appointment with PL, or he can refill with PCP.

## 2018-03-22 NOTE — TELEPHONE ENCOUNTER
I left voicemail for pt, stating we could get him in next week to see Dr Genaro LANG, have not seen in office since 7/19/16 and needs annual f/u before refilling dicyclomine. Alternately, he may get refills through his PCP. Asked pt to call back.

## 2018-04-06 RX ORDER — GLYBURIDE-METFORMIN HYDROCHLORIDE 2.5; 5 MG/1; MG/1
TABLET ORAL
Qty: 90 TABLET | Refills: 0 | Status: SHIPPED | OUTPATIENT
Start: 2018-04-06 | End: 2018-07-02

## 2018-04-06 NOTE — TELEPHONE ENCOUNTER
Signed Prescriptions Disp Refills    GLYBURIDE-METFORMIN 2.5-500 MG Oral Tab 90 tablet 0      Sig: TAKE 1 TABLET BY MOUTH DAILY WITH BREAKFAST        Authorizing Provider: Shaheen SWAIN Mouse        Ordering User: Norman Taylor           Refill approved per p

## 2018-04-11 ENCOUNTER — NURSE ONLY (OUTPATIENT)
Dept: ALLERGY | Facility: CLINIC | Age: 58
End: 2018-04-11

## 2018-04-11 DIAGNOSIS — J30.89 ENVIRONMENTAL AND SEASONAL ALLERGIES: ICD-10-CM

## 2018-04-11 PROCEDURE — 95117 IMMUNOTHERAPY INJECTIONS: CPT | Performed by: ALLERGY & IMMUNOLOGY

## 2018-05-09 ENCOUNTER — NURSE ONLY (OUTPATIENT)
Dept: ALLERGY | Facility: CLINIC | Age: 58
End: 2018-05-09

## 2018-05-09 ENCOUNTER — OFFICE VISIT (OUTPATIENT)
Dept: ALLERGY | Facility: CLINIC | Age: 58
End: 2018-05-09

## 2018-05-09 VITALS
DIASTOLIC BLOOD PRESSURE: 60 MMHG | WEIGHT: 160 LBS | BODY MASS INDEX: 24.25 KG/M2 | HEIGHT: 68 IN | OXYGEN SATURATION: 96 % | TEMPERATURE: 98 F | SYSTOLIC BLOOD PRESSURE: 110 MMHG | HEART RATE: 86 BPM

## 2018-05-09 DIAGNOSIS — Z91.048 ALLERGY TO MOLD: Primary | ICD-10-CM

## 2018-05-09 DIAGNOSIS — J30.1 ALLERGIC RHINITIS DUE TO POLLEN, UNSPECIFIED SEASONALITY: ICD-10-CM

## 2018-05-09 DIAGNOSIS — J30.81 ALLERGIC RHINITIS DUE TO ANIMAL (CAT) (DOG) HAIR AND DANDER: ICD-10-CM

## 2018-05-09 DIAGNOSIS — Z91.09 ALLERGY TO AMERICAN HOUSE DUST MITE: ICD-10-CM

## 2018-05-09 PROCEDURE — 95117 IMMUNOTHERAPY INJECTIONS: CPT | Performed by: ALLERGY & IMMUNOLOGY

## 2018-05-09 PROCEDURE — 99214 OFFICE O/P EST MOD 30 MIN: CPT | Performed by: ALLERGY & IMMUNOLOGY

## 2018-05-09 PROCEDURE — 99212 OFFICE O/P EST SF 10 MIN: CPT | Performed by: ALLERGY & IMMUNOLOGY

## 2018-05-09 RX ORDER — FLUTICASONE PROPIONATE 50 MCG
SPRAY, SUSPENSION (ML) NASAL
Qty: 3 INHALER | Refills: 2 | Status: SHIPPED | OUTPATIENT
Start: 2018-05-09 | End: 2019-07-06

## 2018-05-09 NOTE — PROGRESS NOTES
Audi Quevedo is a 65year old male. HPI:   Patient presents with: Allergies: Pt presents with f/u     Patient is a 60-year-old male who presents for follow-up with a chief complaint of allergies.     Patient last seen by me in April 2017    Patient ha Rfl: 0   Dicyclomine HCl 20 MG Oral Tab TAKE 1 TABLET(20 MG) BY MOUTH THREE TIMES DAILY Disp: 90 tablet Rfl: 0   FLUTICASONE PROPIONATE 50 MCG/ACT Nasal Suspension SHAKE LIQUID AND USE 2 SPRAYS IN EACH NOSTRIL DAILY Disp: 1 Inhaler Rfl: 5   TRUE METRIX BLO noted  Respiratory: normal to inspection lungs are clear to auscultation bilaterally normal respiratory effort   Cardiovascular: regular rate and rhythm no murmurs, gallups, or rubs  Abdomen: soft non-tender non-distended  Skin/Hair: no unusual rashes pres

## 2018-06-06 ENCOUNTER — NURSE ONLY (OUTPATIENT)
Dept: ALLERGY | Facility: CLINIC | Age: 58
End: 2018-06-06

## 2018-06-06 DIAGNOSIS — J30.89 ENVIRONMENTAL AND SEASONAL ALLERGIES: ICD-10-CM

## 2018-06-06 PROCEDURE — 95117 IMMUNOTHERAPY INJECTIONS: CPT | Performed by: ALLERGY & IMMUNOLOGY

## 2018-06-18 RX ORDER — DICYCLOMINE HCL 20 MG
TABLET ORAL
Qty: 90 TABLET | Refills: 0 | Status: SHIPPED | OUTPATIENT
Start: 2018-06-18 | End: 2018-09-17

## 2018-06-25 RX ORDER — GLUCOSAM/CHON-MSM1/C/MANG/BOSW 500-416.6
TABLET ORAL
Qty: 1 BOX | Refills: 0 | Status: SHIPPED | OUTPATIENT
Start: 2018-06-25 | End: 2018-09-30

## 2018-07-02 RX ORDER — GLYBURIDE-METFORMIN HYDROCHLORIDE 2.5; 5 MG/1; MG/1
TABLET ORAL
Qty: 90 TABLET | Refills: 0 | Status: SHIPPED | OUTPATIENT
Start: 2018-07-02 | End: 2018-09-24

## 2018-07-03 ENCOUNTER — NURSE ONLY (OUTPATIENT)
Dept: ALLERGY | Facility: CLINIC | Age: 58
End: 2018-07-03

## 2018-07-03 DIAGNOSIS — J30.89 ENVIRONMENTAL AND SEASONAL ALLERGIES: ICD-10-CM

## 2018-07-03 PROCEDURE — 95165 ANTIGEN THERAPY SERVICES: CPT | Performed by: ALLERGY & IMMUNOLOGY

## 2018-07-03 PROCEDURE — 95117 IMMUNOTHERAPY INJECTIONS: CPT | Performed by: ALLERGY & IMMUNOLOGY

## 2018-08-01 ENCOUNTER — NURSE ONLY (OUTPATIENT)
Dept: ALLERGY | Facility: CLINIC | Age: 58
End: 2018-08-01
Payer: COMMERCIAL

## 2018-08-01 DIAGNOSIS — J30.89 ENVIRONMENTAL AND SEASONAL ALLERGIES: ICD-10-CM

## 2018-08-01 PROCEDURE — 95117 IMMUNOTHERAPY INJECTIONS: CPT | Performed by: ALLERGY & IMMUNOLOGY

## 2018-08-07 RX ORDER — CALCIUM CITRATE/VITAMIN D3 200MG-6.25
TABLET ORAL
Qty: 100 STRIP | Refills: 5 | Status: SHIPPED | OUTPATIENT
Start: 2018-08-07 | End: 2019-08-15

## 2018-08-29 ENCOUNTER — TELEPHONE (OUTPATIENT)
Dept: FAMILY MEDICINE CLINIC | Facility: CLINIC | Age: 58
End: 2018-08-29

## 2018-08-29 ENCOUNTER — NURSE ONLY (OUTPATIENT)
Dept: ALLERGY | Facility: CLINIC | Age: 58
End: 2018-08-29
Payer: COMMERCIAL

## 2018-08-29 DIAGNOSIS — J30.89 ENVIRONMENTAL AND SEASONAL ALLERGIES: ICD-10-CM

## 2018-08-29 DIAGNOSIS — Z86.39 HISTORY OF DIABETES MELLITUS: Primary | ICD-10-CM

## 2018-08-29 PROCEDURE — 95117 IMMUNOTHERAPY INJECTIONS: CPT | Performed by: ALLERGY & IMMUNOLOGY

## 2018-08-29 NOTE — TELEPHONE ENCOUNTER
Patient requesting labs in conjunction with diabetes follow up to be ordered.      Once labs completed will make appointment to follow up with Dr. Demetria Gonzalez

## 2018-09-05 ENCOUNTER — APPOINTMENT (OUTPATIENT)
Dept: LAB | Age: 58
End: 2018-09-05
Attending: FAMILY MEDICINE
Payer: COMMERCIAL

## 2018-09-05 DIAGNOSIS — Z86.39 HISTORY OF DIABETES MELLITUS: ICD-10-CM

## 2018-09-05 LAB
ALBUMIN SERPL BCP-MCNC: 4.1 G/DL (ref 3.5–4.8)
ALBUMIN/GLOB SERPL: 1.6 {RATIO} (ref 1–2)
ALP SERPL-CCNC: 41 U/L (ref 32–100)
ALT SERPL-CCNC: 30 U/L (ref 17–63)
ANION GAP SERPL CALC-SCNC: 7 MMOL/L (ref 0–18)
AST SERPL-CCNC: 20 U/L (ref 15–41)
BILIRUB SERPL-MCNC: 0.9 MG/DL (ref 0.3–1.2)
BUN SERPL-MCNC: 13 MG/DL (ref 8–20)
BUN/CREAT SERPL: 15.9 (ref 10–20)
CALCIUM SERPL-MCNC: 8.7 MG/DL (ref 8.5–10.5)
CHLORIDE SERPL-SCNC: 105 MMOL/L (ref 95–110)
CHOLEST SERPL-MCNC: 173 MG/DL (ref 110–200)
CO2 SERPL-SCNC: 26 MMOL/L (ref 22–32)
CREAT SERPL-MCNC: 0.82 MG/DL (ref 0.5–1.5)
CREAT UR-MCNC: 178.6 MG/DL
ERYTHROCYTE [DISTWIDTH] IN BLOOD BY AUTOMATED COUNT: 12.8 % (ref 11–15)
GLOBULIN PLAS-MCNC: 2.6 G/DL (ref 2.5–3.7)
GLUCOSE SERPL-MCNC: 172 MG/DL (ref 70–99)
HBA1C MFR BLD: 6.6 % (ref 4–6)
HCT VFR BLD AUTO: 49.3 % (ref 41–52)
HDLC SERPL-MCNC: 47 MG/DL
HGB BLD-MCNC: 16.3 G/DL (ref 13.5–17.5)
LDLC SERPL CALC-MCNC: 117 MG/DL (ref 0–99)
MCH RBC QN AUTO: 31 PG (ref 27–32)
MCHC RBC AUTO-ENTMCNC: 33.2 G/DL (ref 32–37)
MCV RBC AUTO: 93.6 FL (ref 80–100)
MICROALBUMIN UR-MCNC: 0.7 MG/DL (ref 0–1.8)
MICROALBUMIN/CREAT UR: 3.9 MG/G{CREAT} (ref 0–20)
NONHDLC SERPL-MCNC: 126 MG/DL
OSMOLALITY UR CALC.SUM OF ELEC: 290 MOSM/KG (ref 275–295)
PATIENT FASTING: YES
PLATELET # BLD AUTO: 187 K/UL (ref 140–400)
PMV BLD AUTO: 8.6 FL (ref 7.4–10.3)
POTASSIUM SERPL-SCNC: 4.2 MMOL/L (ref 3.3–5.1)
PROT SERPL-MCNC: 6.7 G/DL (ref 5.9–8.4)
RBC # BLD AUTO: 5.27 M/UL (ref 4.5–5.9)
SODIUM SERPL-SCNC: 138 MMOL/L (ref 136–144)
TRIGL SERPL-MCNC: 43 MG/DL (ref 1–149)
WBC # BLD AUTO: 5.1 K/UL (ref 4–11)

## 2018-09-05 PROCEDURE — 80053 COMPREHEN METABOLIC PANEL: CPT

## 2018-09-05 PROCEDURE — 85027 COMPLETE CBC AUTOMATED: CPT

## 2018-09-05 PROCEDURE — 36415 COLL VENOUS BLD VENIPUNCTURE: CPT

## 2018-09-05 PROCEDURE — 83036 HEMOGLOBIN GLYCOSYLATED A1C: CPT

## 2018-09-05 PROCEDURE — 82043 UR ALBUMIN QUANTITATIVE: CPT

## 2018-09-05 PROCEDURE — 80061 LIPID PANEL: CPT

## 2018-09-05 PROCEDURE — 82570 ASSAY OF URINE CREATININE: CPT

## 2018-09-06 ENCOUNTER — TELEPHONE (OUTPATIENT)
Dept: FAMILY MEDICINE CLINIC | Facility: CLINIC | Age: 58
End: 2018-09-06

## 2018-09-06 NOTE — TELEPHONE ENCOUNTER
Discussed recent lab work; hgba1c stable; LDL elevated at 117; encouraged low cholesterol diet and consider statin treatment. Pt will try to eat better and make follow up appointment to discuss further.

## 2018-09-06 NOTE — TELEPHONE ENCOUNTER
Pt is calling requesting to speak with Dr TEJADA state that she would like to ask a question about his lab result   No further info was given

## 2018-09-17 RX ORDER — DICYCLOMINE HCL 20 MG
TABLET ORAL
Qty: 90 TABLET | Refills: 0 | Status: SHIPPED | OUTPATIENT
Start: 2018-09-17 | End: 2018-12-11

## 2018-09-19 ENCOUNTER — TELEPHONE (OUTPATIENT)
Dept: FAMILY MEDICINE CLINIC | Facility: CLINIC | Age: 58
End: 2018-09-19

## 2018-09-24 RX ORDER — GLYBURIDE-METFORMIN HYDROCHLORIDE 2.5; 5 MG/1; MG/1
TABLET ORAL
Qty: 90 TABLET | Refills: 1 | Status: SHIPPED | OUTPATIENT
Start: 2018-09-24 | End: 2019-03-26

## 2018-09-26 ENCOUNTER — NURSE ONLY (OUTPATIENT)
Dept: ALLERGY | Facility: CLINIC | Age: 58
End: 2018-09-26
Payer: COMMERCIAL

## 2018-09-26 DIAGNOSIS — J30.89 ENVIRONMENTAL AND SEASONAL ALLERGIES: ICD-10-CM

## 2018-09-26 PROCEDURE — 95117 IMMUNOTHERAPY INJECTIONS: CPT | Performed by: ALLERGY & IMMUNOLOGY

## 2018-10-01 RX ORDER — GLUCOSAM/CHON-MSM1/C/MANG/BOSW 500-416.6
TABLET ORAL
Qty: 100 EACH | Refills: 0 | Status: SHIPPED | OUTPATIENT
Start: 2018-10-01 | End: 2018-12-27

## 2018-10-01 NOTE — TELEPHONE ENCOUNTER
Approved per protocol. Thanks  Diabetic Supplies Protocol Passed9/30 9:27 AM   Appointment in past 12 or next 3 months   Diabetes Medications  Protocol Criteria:  · Appointment scheduled in the past 6 months or the next 3 months  · A1C < 7.5 in the past 6 m

## 2018-10-24 ENCOUNTER — NURSE ONLY (OUTPATIENT)
Dept: ALLERGY | Facility: CLINIC | Age: 58
End: 2018-10-24
Payer: COMMERCIAL

## 2018-10-24 DIAGNOSIS — J30.89 ENVIRONMENTAL AND SEASONAL ALLERGIES: ICD-10-CM

## 2018-10-24 PROCEDURE — 95117 IMMUNOTHERAPY INJECTIONS: CPT | Performed by: ALLERGY & IMMUNOLOGY

## 2018-10-29 RX ORDER — GLUCOSAM/CHON-MSM1/C/MANG/BOSW 500-416.6
TABLET ORAL
Refills: 0 | OUTPATIENT
Start: 2018-10-29

## 2018-10-29 NOTE — TELEPHONE ENCOUNTER
Spoke with Carmela Lara, pharmacist, advised that refill was just esent last 10/1/18 for 100 lancets, will disregard the request.

## 2018-11-21 ENCOUNTER — NURSE ONLY (OUTPATIENT)
Dept: ALLERGY | Facility: CLINIC | Age: 58
End: 2018-11-21
Payer: COMMERCIAL

## 2018-11-21 DIAGNOSIS — J30.1 ALLERGIC RHINITIS DUE TO POLLEN, UNSPECIFIED SEASONALITY: ICD-10-CM

## 2018-11-21 PROCEDURE — 95117 IMMUNOTHERAPY INJECTIONS: CPT | Performed by: ALLERGY & IMMUNOLOGY

## 2018-12-11 RX ORDER — DICYCLOMINE HCL 20 MG
TABLET ORAL
Qty: 90 TABLET | Refills: 0 | Status: SHIPPED | OUTPATIENT
Start: 2018-12-11 | End: 2019-03-15

## 2018-12-12 NOTE — TELEPHONE ENCOUNTER
Refill passed per [facility], [facility] protocol.   Refill Protocol Appointment Criteria  · Appointment scheduled in the past 12 months or in the next 3 months  Recent Outpatient Visits            2 weeks ago Allergic rhinitis due to pollen, unspecified seasonali

## 2018-12-18 ENCOUNTER — NURSE ONLY (OUTPATIENT)
Dept: ALLERGY | Facility: CLINIC | Age: 58
End: 2018-12-18
Payer: COMMERCIAL

## 2018-12-18 DIAGNOSIS — J30.89 ENVIRONMENTAL AND SEASONAL ALLERGIES: ICD-10-CM

## 2018-12-18 PROCEDURE — 95117 IMMUNOTHERAPY INJECTIONS: CPT | Performed by: ALLERGY & IMMUNOLOGY

## 2018-12-27 RX ORDER — GLUCOSAM/CHON-MSM1/C/MANG/BOSW 500-416.6
TABLET ORAL
Qty: 100 EACH | Refills: 1 | Status: SHIPPED | OUTPATIENT
Start: 2018-12-27 | End: 2019-05-16

## 2019-01-16 ENCOUNTER — TELEPHONE (OUTPATIENT)
Dept: ALLERGY | Facility: CLINIC | Age: 59
End: 2019-01-16

## 2019-01-16 NOTE — TELEPHONE ENCOUNTER
Call reviewed and noted. Please contact patient. Patient may resume allergy shots once his new insurance is in place. If patient is off shots for a prolonged period he may have to rebuild on a weekly basis back to maintenance dosing.   Recommend for farzaneh

## 2019-01-16 NOTE — TELEPHONE ENCOUNTER
Pt is switching insurance and  Is right now has no insurance . Pt is asking  If he misses his allergy shot next week , will that cause  any problems ?

## 2019-01-17 NOTE — TELEPHONE ENCOUNTER
Pt returned RN's telephone call. Pt informed per Dr. Layo Bullock as below. \"Call reviewed and noted. Please contact patient. Patient may resume allergy shots once his new insurance is in place.   If patient is off shots for a prolonged period he may have

## 2019-02-13 ENCOUNTER — NURSE ONLY (OUTPATIENT)
Dept: ALLERGY | Facility: CLINIC | Age: 59
End: 2019-02-13
Payer: COMMERCIAL

## 2019-02-13 PROCEDURE — 95117 IMMUNOTHERAPY INJECTIONS: CPT | Performed by: ALLERGY & IMMUNOLOGY

## 2019-02-27 ENCOUNTER — NURSE ONLY (OUTPATIENT)
Dept: ALLERGY | Facility: CLINIC | Age: 59
End: 2019-02-27
Payer: COMMERCIAL

## 2019-02-27 DIAGNOSIS — J30.89 ENVIRONMENTAL AND SEASONAL ALLERGIES: ICD-10-CM

## 2019-02-27 PROCEDURE — 95117 IMMUNOTHERAPY INJECTIONS: CPT | Performed by: ALLERGY & IMMUNOLOGY

## 2019-03-04 ENCOUNTER — OFFICE VISIT (OUTPATIENT)
Dept: FAMILY MEDICINE CLINIC | Facility: CLINIC | Age: 59
End: 2019-03-04
Payer: COMMERCIAL

## 2019-03-04 ENCOUNTER — NURSE TRIAGE (OUTPATIENT)
Dept: OTHER | Age: 59
End: 2019-03-04

## 2019-03-04 VITALS
RESPIRATION RATE: 18 BRPM | WEIGHT: 161 LBS | HEART RATE: 73 BPM | TEMPERATURE: 98 F | SYSTOLIC BLOOD PRESSURE: 111 MMHG | BODY MASS INDEX: 24.4 KG/M2 | HEIGHT: 68 IN | DIASTOLIC BLOOD PRESSURE: 78 MMHG

## 2019-03-04 DIAGNOSIS — R10.31 RIGHT LOWER QUADRANT ABDOMINAL PAIN: ICD-10-CM

## 2019-03-04 PROCEDURE — 99213 OFFICE O/P EST LOW 20 MIN: CPT | Performed by: FAMILY MEDICINE

## 2019-03-04 PROCEDURE — 99212 OFFICE O/P EST SF 10 MIN: CPT | Performed by: FAMILY MEDICINE

## 2019-03-04 NOTE — TELEPHONE ENCOUNTER
Action Requested: Summary for Provider     []  Critical Lab, Recommendations Needed  [] Need Additional Advice  []   FYI    []   Need Orders  [] Need Medications Sent to Pharmacy  []  Other     SUMMARY: Pt stated that on Monday Feb 25 he started to have so

## 2019-03-04 NOTE — PROGRESS NOTES
HPI:    Patient ID: Irma Franco is a 63year old male. Pt presents with some pain of right lower abdomen over the last week. Pt denies any fevers or any sig diarrhea/ vomiting. Had an episode of diarrhea last week. Pt feels better today.  Pt has hx of Vitamins-Lipotropics (MULTI-VITAMIN HP/MINERALS) Oral Cap Take  by mouth. Disp:  Rfl:      Allergies:  Ciprofloxacin           HIVES   PHYSICAL EXAM:   Physical Exam   Constitutional: He appears well-developed and well-nourished.    Cardiovascular: Normal

## 2019-03-13 ENCOUNTER — NURSE ONLY (OUTPATIENT)
Dept: ALLERGY | Facility: CLINIC | Age: 59
End: 2019-03-13
Payer: COMMERCIAL

## 2019-03-13 DIAGNOSIS — J30.89 ENVIRONMENTAL AND SEASONAL ALLERGIES: ICD-10-CM

## 2019-03-13 PROCEDURE — 95117 IMMUNOTHERAPY INJECTIONS: CPT | Performed by: ALLERGY & IMMUNOLOGY

## 2019-03-15 NOTE — TELEPHONE ENCOUNTER
Please advise in regards to refill request. Thank You  Refill Protocol Appointment Criteria  · Appointment scheduled in the past 12 months or in the next 3 months  Recent Outpatient Visits            2 days ago Environmental and seasonal allergies    Elu

## 2019-03-16 RX ORDER — DICYCLOMINE HCL 20 MG
TABLET ORAL
Qty: 90 TABLET | Refills: 0 | Status: SHIPPED | OUTPATIENT
Start: 2019-03-16 | End: 2019-06-14

## 2019-03-20 ENCOUNTER — OFFICE VISIT (OUTPATIENT)
Dept: FAMILY MEDICINE CLINIC | Facility: CLINIC | Age: 59
End: 2019-03-20
Payer: COMMERCIAL

## 2019-03-20 VITALS
DIASTOLIC BLOOD PRESSURE: 78 MMHG | HEIGHT: 68 IN | WEIGHT: 161 LBS | RESPIRATION RATE: 18 BRPM | TEMPERATURE: 98 F | BODY MASS INDEX: 24.4 KG/M2 | SYSTOLIC BLOOD PRESSURE: 126 MMHG | HEART RATE: 73 BPM

## 2019-03-20 DIAGNOSIS — Z00.00 ROUTINE PHYSICAL EXAMINATION: Primary | ICD-10-CM

## 2019-03-20 DIAGNOSIS — E11.9 CONTROLLED TYPE 2 DIABETES MELLITUS WITHOUT COMPLICATION, WITHOUT LONG-TERM CURRENT USE OF INSULIN (HCC): ICD-10-CM

## 2019-03-20 PROCEDURE — 99396 PREV VISIT EST AGE 40-64: CPT | Performed by: FAMILY MEDICINE

## 2019-03-20 NOTE — PROGRESS NOTES
HPI:    Patient ID: Tessie Melara is a 63year old male. Patient is here for routine physical exam and follow up on chronic medical issues and diabetes. No acute issues. Patient is requesting blood testing. Diet and exercise have been fairy good.  Past Allergies:  Ciprofloxacin           HIVES   PHYSICAL EXAM:   Physical Exam   Constitutional: He is oriented to person, place, and time. He appears well-developed and well-nourished. HENT:   Head: Normocephalic and atraumatic.    Right Ear: External ea Random Urine      Hemoglobin A1C [E]      Comp Metabolic Panel (14) [E]      CBC, Platelet, No Differential [E]      PSA (Screening) [E]      Meds This Visit:  Requested Prescriptions      No prescriptions requested or ordered in this encounter       Imagi

## 2019-03-27 ENCOUNTER — NURSE ONLY (OUTPATIENT)
Dept: ALLERGY | Facility: CLINIC | Age: 59
End: 2019-03-27
Payer: COMMERCIAL

## 2019-03-27 ENCOUNTER — APPOINTMENT (OUTPATIENT)
Dept: LAB | Age: 59
End: 2019-03-27
Attending: FAMILY MEDICINE
Payer: COMMERCIAL

## 2019-03-27 DIAGNOSIS — J30.89 ENVIRONMENTAL AND SEASONAL ALLERGIES: ICD-10-CM

## 2019-03-27 DIAGNOSIS — E11.9 CONTROLLED TYPE 2 DIABETES MELLITUS WITHOUT COMPLICATION, WITHOUT LONG-TERM CURRENT USE OF INSULIN (HCC): ICD-10-CM

## 2019-03-27 DIAGNOSIS — Z00.00 ROUTINE PHYSICAL EXAMINATION: ICD-10-CM

## 2019-03-27 LAB
ALBUMIN SERPL-MCNC: 3.9 G/DL (ref 3.4–5)
ALBUMIN/GLOB SERPL: 1.2 {RATIO} (ref 1–2)
ALP LIVER SERPL-CCNC: 56 U/L (ref 45–117)
ALT SERPL-CCNC: 30 U/L (ref 16–61)
ANION GAP SERPL CALC-SCNC: 5 MMOL/L (ref 0–18)
AST SERPL-CCNC: 11 U/L (ref 15–37)
BILIRUB SERPL-MCNC: 0.6 MG/DL (ref 0.1–2)
BUN BLD-MCNC: 17 MG/DL (ref 7–18)
BUN/CREAT SERPL: 21.5 (ref 10–20)
CALCIUM BLD-MCNC: 9.2 MG/DL (ref 8.5–10.1)
CHLORIDE SERPL-SCNC: 106 MMOL/L (ref 98–107)
CHOLEST SMN-MCNC: 156 MG/DL (ref ?–200)
CO2 SERPL-SCNC: 29 MMOL/L (ref 21–32)
COMPLEXED PSA SERPL-MCNC: 0.56 NG/ML (ref ?–4)
CREAT BLD-MCNC: 0.79 MG/DL (ref 0.7–1.3)
CREAT UR-SCNC: 125 MG/DL
DEPRECATED RDW RBC AUTO: 40.6 FL (ref 35.1–46.3)
ERYTHROCYTE [DISTWIDTH] IN BLOOD BY AUTOMATED COUNT: 12.3 % (ref 11–15)
EST. AVERAGE GLUCOSE BLD GHB EST-MCNC: 163 MG/DL (ref 68–126)
GLOBULIN PLAS-MCNC: 3.3 G/DL (ref 2.8–4.4)
GLUCOSE BLD-MCNC: 155 MG/DL (ref 70–99)
HBA1C MFR BLD HPLC: 7.3 % (ref ?–5.7)
HCT VFR BLD AUTO: 47.4 % (ref 39–53)
HDLC SERPL-MCNC: 45 MG/DL (ref 40–59)
HGB BLD-MCNC: 16.1 G/DL (ref 13–17.5)
LDLC SERPL CALC-MCNC: 100 MG/DL (ref ?–100)
M PROTEIN MFR SERPL ELPH: 7.2 G/DL (ref 6.4–8.2)
MCH RBC QN AUTO: 31.1 PG (ref 26–34)
MCHC RBC AUTO-ENTMCNC: 34 G/DL (ref 31–37)
MCV RBC AUTO: 91.5 FL (ref 80–100)
MICROALBUMIN UR-MCNC: 0.95 MG/DL
MICROALBUMIN/CREAT 24H UR-RTO: 7.6 UG/MG (ref ?–30)
NONHDLC SERPL-MCNC: 111 MG/DL (ref ?–130)
OSMOLALITY SERPL CALC.SUM OF ELEC: 295 MOSM/KG (ref 275–295)
PLATELET # BLD AUTO: 200 10(3)UL (ref 150–450)
POTASSIUM SERPL-SCNC: 4.2 MMOL/L (ref 3.5–5.1)
RBC # BLD AUTO: 5.18 X10(6)UL (ref 4.3–5.7)
SODIUM SERPL-SCNC: 140 MMOL/L (ref 136–145)
TRIGL SERPL-MCNC: 54 MG/DL (ref 30–149)
VLDLC SERPL CALC-MCNC: 11 MG/DL (ref 0–30)
WBC # BLD AUTO: 5.6 X10(3) UL (ref 4–11)

## 2019-03-27 PROCEDURE — 80053 COMPREHEN METABOLIC PANEL: CPT

## 2019-03-27 PROCEDURE — 82570 ASSAY OF URINE CREATININE: CPT

## 2019-03-27 PROCEDURE — 36415 COLL VENOUS BLD VENIPUNCTURE: CPT

## 2019-03-27 PROCEDURE — 95117 IMMUNOTHERAPY INJECTIONS: CPT | Performed by: ALLERGY & IMMUNOLOGY

## 2019-03-27 PROCEDURE — 83036 HEMOGLOBIN GLYCOSYLATED A1C: CPT

## 2019-03-27 PROCEDURE — 82043 UR ALBUMIN QUANTITATIVE: CPT

## 2019-03-27 PROCEDURE — 85027 COMPLETE CBC AUTOMATED: CPT

## 2019-03-27 PROCEDURE — 80061 LIPID PANEL: CPT

## 2019-03-27 RX ORDER — GLYBURIDE-METFORMIN HYDROCHLORIDE 2.5; 5 MG/1; MG/1
TABLET ORAL
Qty: 90 TABLET | Refills: 0 | Status: SHIPPED | OUTPATIENT
Start: 2019-03-27 | End: 2019-06-21

## 2019-03-27 NOTE — TELEPHONE ENCOUNTER
Diabetes Medications  Protocol Criteria:  · Appointment scheduled in the past 6 months or the next 3 months  · A1C < 7.5 in the past 6 months  · Creatinine in the past 12 months  · Creatinine result < 1.5   Recent Outpatient Visits            1 week ago Ro

## 2019-04-05 ENCOUNTER — TELEPHONE (OUTPATIENT)
Dept: FAMILY MEDICINE CLINIC | Facility: CLINIC | Age: 59
End: 2019-04-05

## 2019-04-05 NOTE — TELEPHONE ENCOUNTER
Pt would like to inform Dr. Alejandro Tejada that he had his retinal eye exam done today. No need to call back this an fyi for the doctor. Per pt this keeps coming up on his my chart and would like that updated.

## 2019-04-11 ENCOUNTER — OFFICE VISIT (OUTPATIENT)
Dept: FAMILY MEDICINE CLINIC | Facility: CLINIC | Age: 59
End: 2019-04-11
Payer: COMMERCIAL

## 2019-04-11 VITALS
DIASTOLIC BLOOD PRESSURE: 80 MMHG | SYSTOLIC BLOOD PRESSURE: 116 MMHG | HEART RATE: 82 BPM | HEIGHT: 68 IN | BODY MASS INDEX: 24.4 KG/M2 | WEIGHT: 161 LBS | TEMPERATURE: 99 F | RESPIRATION RATE: 20 BRPM

## 2019-04-11 DIAGNOSIS — J06.9 ACUTE URI: ICD-10-CM

## 2019-04-11 PROCEDURE — 99212 OFFICE O/P EST SF 10 MIN: CPT | Performed by: FAMILY MEDICINE

## 2019-04-11 PROCEDURE — 99213 OFFICE O/P EST LOW 20 MIN: CPT | Performed by: FAMILY MEDICINE

## 2019-04-11 RX ORDER — AMOXICILLIN AND CLAVULANATE POTASSIUM 875; 125 MG/1; MG/1
1 TABLET, FILM COATED ORAL 2 TIMES DAILY
Qty: 20 TABLET | Refills: 0 | Status: SHIPPED | OUTPATIENT
Start: 2019-04-11 | End: 2019-09-24

## 2019-04-11 NOTE — PROGRESS NOTES
HPI:    Patient ID: Berta Gandhi is a 63year old male. Pt presents with cold symptoms for 7 days. Pt has had cough, sore throat. Low grade fevers. Pt has tried otc remedies without relief. Pt states sick contacts at home and work.        URI    This i He appears well-developed and well-nourished. HENT:   Right Ear: Tympanic membrane and ear canal normal.   Left Ear: Tympanic membrane and ear canal normal.   Mouth/Throat: Oropharynx is clear and moist.   Neck: Neck supple.    Cardiovascular: Normal rate

## 2019-04-17 ENCOUNTER — NURSE ONLY (OUTPATIENT)
Dept: ALLERGY | Facility: CLINIC | Age: 59
End: 2019-04-17
Payer: COMMERCIAL

## 2019-04-17 DIAGNOSIS — J30.89 ENVIRONMENTAL AND SEASONAL ALLERGIES: ICD-10-CM

## 2019-04-17 PROCEDURE — 95117 IMMUNOTHERAPY INJECTIONS: CPT | Performed by: ALLERGY & IMMUNOLOGY

## 2019-05-15 ENCOUNTER — NURSE ONLY (OUTPATIENT)
Dept: ALLERGY | Facility: CLINIC | Age: 59
End: 2019-05-15
Payer: COMMERCIAL

## 2019-05-15 DIAGNOSIS — J30.89 ENVIRONMENTAL AND SEASONAL ALLERGIES: ICD-10-CM

## 2019-05-15 PROCEDURE — 95117 IMMUNOTHERAPY INJECTIONS: CPT | Performed by: ALLERGY & IMMUNOLOGY

## 2019-05-17 RX ORDER — GLUCOSAM/CHON-MSM1/C/MANG/BOSW 500-416.6
TABLET ORAL
Qty: 100 EACH | Refills: 3 | Status: SHIPPED | OUTPATIENT
Start: 2019-05-17 | End: 2020-05-26

## 2019-06-12 ENCOUNTER — NURSE ONLY (OUTPATIENT)
Dept: ALLERGY | Facility: CLINIC | Age: 59
End: 2019-06-12
Payer: COMMERCIAL

## 2019-06-12 DIAGNOSIS — J30.89 ENVIRONMENTAL AND SEASONAL ALLERGIES: ICD-10-CM

## 2019-06-12 PROCEDURE — 95117 IMMUNOTHERAPY INJECTIONS: CPT | Performed by: ALLERGY & IMMUNOLOGY

## 2019-06-14 RX ORDER — DICYCLOMINE HCL 20 MG
TABLET ORAL
Qty: 90 TABLET | Refills: 0 | Status: SHIPPED | OUTPATIENT
Start: 2019-06-14 | End: 2019-09-12

## 2019-06-14 NOTE — TELEPHONE ENCOUNTER
Refill passed per [facility], [facility] protocol.   Refill Protocol Appointment Criteria  · Appointment scheduled in the past 12 months or in the next 3 months  Recent Outpatient Visits            2 days ago Environmental and seasonal allergies    [facility], [facility]

## 2019-06-21 RX ORDER — GLYBURIDE-METFORMIN HYDROCHLORIDE 2.5; 5 MG/1; MG/1
TABLET ORAL
Qty: 90 TABLET | Refills: 1 | Status: SHIPPED | OUTPATIENT
Start: 2019-06-21 | End: 2019-12-12

## 2019-06-22 NOTE — TELEPHONE ENCOUNTER
Refill passed per [facility], [facility] protocol.   Diabetes Medications  Protocol Criteria:  · Appointment scheduled in the past 6 months or the next 3 months  · A1C < 7.5 in the past 6 months  · Creatinine in the past 12 months  · Creatinine result < 1.5   Rece

## 2019-07-08 RX ORDER — FLUTICASONE PROPIONATE 50 MCG
SPRAY, SUSPENSION (ML) NASAL
Qty: 1 BOTTLE | Refills: 0 | Status: SHIPPED | OUTPATIENT
Start: 2019-07-08 | End: 2019-08-02

## 2019-07-08 NOTE — TELEPHONE ENCOUNTER
Spoke to patient regarding medication refill and assisted in scheduling a follow up appointment for 7/10/2019 at 1045.

## 2019-07-08 NOTE — TELEPHONE ENCOUNTER
Pt last seen in Allergy 5/9/2018 for . . .    Allergy to mold  (primary encounter diagnosis)  Allergy to american house dust mite  Allergic rhinitis due to animal (cat) (dog) hair and dander    Refill request received for .  . .    Fluticasone Propionate 50

## 2019-07-10 ENCOUNTER — NURSE ONLY (OUTPATIENT)
Dept: ALLERGY | Facility: CLINIC | Age: 59
End: 2019-07-10
Payer: COMMERCIAL

## 2019-07-10 ENCOUNTER — OFFICE VISIT (OUTPATIENT)
Dept: ALLERGY | Facility: CLINIC | Age: 59
End: 2019-07-10
Payer: COMMERCIAL

## 2019-07-10 VITALS
SYSTOLIC BLOOD PRESSURE: 127 MMHG | OXYGEN SATURATION: 96 % | RESPIRATION RATE: 18 BRPM | HEART RATE: 64 BPM | DIASTOLIC BLOOD PRESSURE: 83 MMHG | TEMPERATURE: 98 F

## 2019-07-10 DIAGNOSIS — J30.89 ENVIRONMENTAL AND SEASONAL ALLERGIES: ICD-10-CM

## 2019-07-10 DIAGNOSIS — Z91.048 ALLERGY TO MOLD: ICD-10-CM

## 2019-07-10 DIAGNOSIS — J30.1 ALLERGIC RHINITIS DUE TO POLLEN, UNSPECIFIED SEASONALITY: Primary | ICD-10-CM

## 2019-07-10 DIAGNOSIS — J30.81 ALLERGIC RHINITIS DUE TO ANIMAL (CAT) (DOG) HAIR AND DANDER: ICD-10-CM

## 2019-07-10 PROCEDURE — 99214 OFFICE O/P EST MOD 30 MIN: CPT | Performed by: ALLERGY & IMMUNOLOGY

## 2019-07-10 PROCEDURE — 95117 IMMUNOTHERAPY INJECTIONS: CPT | Performed by: ALLERGY & IMMUNOLOGY

## 2019-07-10 RX ORDER — FEXOFENADINE HCL 180 MG/1
180 TABLET ORAL DAILY
COMMUNITY

## 2019-07-10 NOTE — PROGRESS NOTES
Allie Goodrich is a 63year old male. HPI:   Patient presents with: Allergies: pt reports that his symptoms flare up when he is coming up due for an allergy injection. Usually a couple days before he is due.      Patient is a 27-year-old male who presen visit):    Current Outpatient Medications:  Fexofenadine HCl 180 MG Oral Tab Take 180 mg by mouth daily.  Disp:  Rfl:    FLUTICASONE PROPIONATE 50 MCG/ACT Nasal Suspension SHAKE LIQUID AND USE 2 SPRAYS IN EACH NOSTRIL DAILY Disp: 1 Bottle Rfl: 0   GLYBURIDE conjunctiva and lids are normal extraocular motion is intact   Ears/Audiometry: tympanic membranes are normal bilaterally hearing is grossly intact  Nose/Mouth/Throat: nose and throat are clear mucous membranes are moist   Neck/Thyroid: neck is supple with Teaching included  a review of potential adverse side effects as well as potential efficacy. Patient's questions were answered in regards to medication administration and dosing and potential side effects.  Teaching was provided via the teach back method

## 2019-07-10 NOTE — PATIENT INSTRUCTIONS
Stable and controlled at this time with current regimen including maintenance dose immunotherapy every 4 weeks to mold dust mites and cats as well as of using Allegra.   Overall immunotherapy has helped with his symptoms over time  No adverse reactions to

## 2019-08-05 RX ORDER — FLUTICASONE PROPIONATE 50 MCG
SPRAY, SUSPENSION (ML) NASAL
Qty: 3 BOTTLE | Refills: 2 | Status: SHIPPED | OUTPATIENT
Start: 2019-08-05 | End: 2020-08-20

## 2019-08-14 ENCOUNTER — TELEPHONE (OUTPATIENT)
Dept: ALLERGY | Facility: CLINIC | Age: 59
End: 2019-08-14

## 2019-08-14 ENCOUNTER — NURSE ONLY (OUTPATIENT)
Dept: ALLERGY | Facility: CLINIC | Age: 59
End: 2019-08-14
Payer: COMMERCIAL

## 2019-08-14 DIAGNOSIS — J30.89 ENVIRONMENTAL AND SEASONAL ALLERGIES: ICD-10-CM

## 2019-08-14 PROCEDURE — 95117 IMMUNOTHERAPY INJECTIONS: CPT | Performed by: ALLERGY & IMMUNOLOGY

## 2019-08-14 NOTE — TELEPHONE ENCOUNTER
Pt calling to inquire when he is due for allergy shots next. Currently on maintenance dose. Last AIT was 7/10/19. May get injections 5-35 days from then per protocol. Today is day 35, will need to come in today to avoid having dose decreased.   I notifie

## 2019-08-16 RX ORDER — CALCIUM CITRATE/VITAMIN D3 200MG-6.25
TABLET ORAL
Qty: 100 STRIP | Refills: 3 | Status: SHIPPED | OUTPATIENT
Start: 2019-08-16 | End: 2020-07-20

## 2019-08-17 NOTE — TELEPHONE ENCOUNTER
Refill passed per [facility], [facility] protocol.     Requested Prescriptions   Pending Prescriptions Disp Refills   • TRUE METRIX BLOOD GLUCOSE TEST In Vitro Strip [Pharmacy Med Name: TRUE METRIX B/G TEST STRIPS 25'S] 100 strip 0     Sig: USE ONE STRIP DAILY FOR

## 2019-09-11 ENCOUNTER — NURSE ONLY (OUTPATIENT)
Dept: ALLERGY | Facility: CLINIC | Age: 59
End: 2019-09-11
Payer: COMMERCIAL

## 2019-09-11 DIAGNOSIS — J30.89 ENVIRONMENTAL AND SEASONAL ALLERGIES: ICD-10-CM

## 2019-09-11 PROCEDURE — 95117 IMMUNOTHERAPY INJECTIONS: CPT | Performed by: ALLERGY & IMMUNOLOGY

## 2019-09-13 RX ORDER — DICYCLOMINE HCL 20 MG
TABLET ORAL
Qty: 90 TABLET | Refills: 0 | Status: SHIPPED | OUTPATIENT
Start: 2019-09-13 | End: 2019-12-14

## 2019-09-13 NOTE — TELEPHONE ENCOUNTER
Refill passed per [facility], [facility] protocol.  Radha Carter 3/20/19  Refill Protocol Appointment Criteria  · Appointment scheduled in the past 12 months or in the next 3 months  Recent Outpatient Visits            2 days ago Environmental and seasonal aller

## 2019-09-16 ENCOUNTER — TELEPHONE (OUTPATIENT)
Dept: FAMILY MEDICINE CLINIC | Facility: CLINIC | Age: 59
End: 2019-09-16

## 2019-09-16 DIAGNOSIS — E11.9 CONTROLLED TYPE 2 DIABETES MELLITUS WITHOUT COMPLICATION, UNSPECIFIED WHETHER LONG TERM INSULIN USE (HCC): Primary | ICD-10-CM

## 2019-09-17 NOTE — TELEPHONE ENCOUNTER
Pt. Call message left on voicemail that his lab order is in the system and to Fast 12 hrs NPO only water.

## 2019-09-21 ENCOUNTER — APPOINTMENT (OUTPATIENT)
Dept: LAB | Age: 59
End: 2019-09-21
Attending: FAMILY MEDICINE
Payer: COMMERCIAL

## 2019-09-21 DIAGNOSIS — E11.9 CONTROLLED TYPE 2 DIABETES MELLITUS WITHOUT COMPLICATION, UNSPECIFIED WHETHER LONG TERM INSULIN USE (HCC): ICD-10-CM

## 2019-09-21 LAB
ALBUMIN SERPL-MCNC: 3.9 G/DL (ref 3.4–5)
ALBUMIN/GLOB SERPL: 1.3 {RATIO} (ref 1–2)
ALP LIVER SERPL-CCNC: 53 U/L (ref 45–117)
ALT SERPL-CCNC: 33 U/L (ref 16–61)
ANION GAP SERPL CALC-SCNC: 6 MMOL/L (ref 0–18)
AST SERPL-CCNC: 14 U/L (ref 15–37)
BILIRUB SERPL-MCNC: 0.6 MG/DL (ref 0.1–2)
BUN BLD-MCNC: 16 MG/DL (ref 7–18)
BUN/CREAT SERPL: 19.8 (ref 10–20)
CALCIUM BLD-MCNC: 8.9 MG/DL (ref 8.5–10.1)
CHLORIDE SERPL-SCNC: 107 MMOL/L (ref 98–112)
CHOLEST SMN-MCNC: 154 MG/DL (ref ?–200)
CO2 SERPL-SCNC: 29 MMOL/L (ref 21–32)
CREAT BLD-MCNC: 0.81 MG/DL (ref 0.7–1.3)
EST. AVERAGE GLUCOSE BLD GHB EST-MCNC: 154 MG/DL (ref 68–126)
GLOBULIN PLAS-MCNC: 3.1 G/DL (ref 2.8–4.4)
GLUCOSE BLD-MCNC: 165 MG/DL (ref 70–99)
HBA1C MFR BLD HPLC: 7 % (ref ?–5.7)
HDLC SERPL-MCNC: 41 MG/DL (ref 40–59)
LDLC SERPL CALC-MCNC: 103 MG/DL (ref ?–100)
M PROTEIN MFR SERPL ELPH: 7 G/DL (ref 6.4–8.2)
NONHDLC SERPL-MCNC: 113 MG/DL (ref ?–130)
OSMOLALITY SERPL CALC.SUM OF ELEC: 299 MOSM/KG (ref 275–295)
PATIENT FASTING: YES
PATIENT FASTING: YES
POTASSIUM SERPL-SCNC: 4.3 MMOL/L (ref 3.5–5.1)
SODIUM SERPL-SCNC: 142 MMOL/L (ref 136–145)
TRIGL SERPL-MCNC: 50 MG/DL (ref 30–149)
VLDLC SERPL CALC-MCNC: 10 MG/DL (ref 0–30)

## 2019-09-21 PROCEDURE — 83036 HEMOGLOBIN GLYCOSYLATED A1C: CPT

## 2019-09-21 PROCEDURE — 80053 COMPREHEN METABOLIC PANEL: CPT

## 2019-09-21 PROCEDURE — 36415 COLL VENOUS BLD VENIPUNCTURE: CPT

## 2019-09-21 PROCEDURE — 80061 LIPID PANEL: CPT

## 2019-09-24 ENCOUNTER — OFFICE VISIT (OUTPATIENT)
Dept: FAMILY MEDICINE CLINIC | Facility: CLINIC | Age: 59
End: 2019-09-24
Payer: COMMERCIAL

## 2019-09-24 VITALS
BODY MASS INDEX: 24.1 KG/M2 | RESPIRATION RATE: 18 BRPM | WEIGHT: 159 LBS | HEART RATE: 76 BPM | HEIGHT: 68 IN | SYSTOLIC BLOOD PRESSURE: 127 MMHG | TEMPERATURE: 99 F | DIASTOLIC BLOOD PRESSURE: 79 MMHG

## 2019-09-24 DIAGNOSIS — E11.9 CONTROLLED TYPE 2 DIABETES MELLITUS WITHOUT COMPLICATION, UNSPECIFIED WHETHER LONG TERM INSULIN USE (HCC): ICD-10-CM

## 2019-09-24 PROCEDURE — 99213 OFFICE O/P EST LOW 20 MIN: CPT | Performed by: FAMILY MEDICINE

## 2019-09-24 NOTE — PROGRESS NOTES
HPI:    Patient ID: Audi Quevedo is a 63year old male. Patient is here for follow up for chronic medical issues- diabetes. The patient is compliant with medications and no side effects. There are no acute issues and patient is requesting refills.  The Rfl:      Allergies:  Ciprofloxacin           HIVES   PHYSICAL EXAM:   Physical Exam   Constitutional: He is oriented to person, place, and time. He appears well-developed and well-nourished.    Cardiovascular: Normal rate, regular rhythm, normal heart soun

## 2019-10-09 ENCOUNTER — NURSE ONLY (OUTPATIENT)
Dept: ALLERGY | Facility: CLINIC | Age: 59
End: 2019-10-09
Payer: COMMERCIAL

## 2019-10-09 DIAGNOSIS — J30.89 ENVIRONMENTAL AND SEASONAL ALLERGIES: ICD-10-CM

## 2019-10-09 PROCEDURE — 95117 IMMUNOTHERAPY INJECTIONS: CPT | Performed by: ALLERGY & IMMUNOLOGY

## 2019-11-06 ENCOUNTER — NURSE ONLY (OUTPATIENT)
Dept: ALLERGY | Facility: CLINIC | Age: 59
End: 2019-11-06
Payer: COMMERCIAL

## 2019-11-06 DIAGNOSIS — J30.89 ENVIRONMENTAL AND SEASONAL ALLERGIES: ICD-10-CM

## 2019-11-06 PROCEDURE — 95117 IMMUNOTHERAPY INJECTIONS: CPT | Performed by: ALLERGY & IMMUNOLOGY

## 2019-11-18 ENCOUNTER — OFFICE VISIT (OUTPATIENT)
Dept: FAMILY MEDICINE CLINIC | Facility: CLINIC | Age: 59
End: 2019-11-18
Payer: COMMERCIAL

## 2019-11-18 VITALS
RESPIRATION RATE: 20 BRPM | SYSTOLIC BLOOD PRESSURE: 134 MMHG | TEMPERATURE: 98 F | HEIGHT: 68 IN | BODY MASS INDEX: 23.79 KG/M2 | WEIGHT: 157 LBS | DIASTOLIC BLOOD PRESSURE: 83 MMHG | HEART RATE: 73 BPM

## 2019-11-18 DIAGNOSIS — M25.531 RIGHT WRIST PAIN: Primary | ICD-10-CM

## 2019-11-18 PROCEDURE — 99213 OFFICE O/P EST LOW 20 MIN: CPT | Performed by: FAMILY MEDICINE

## 2019-11-18 NOTE — PROGRESS NOTES
HPI:    Patient ID: Ruddy Nina is a 63year old male. Pt presents with pain of right thumb/ wrist area for the last week. Pt denies any trauma or injury but does use his right hand/ thumb a lot for his phone.  Pt states pain is intermittent and in ce patient, will check xray of the right wrist area if worse/ persistent symptoms. OTC NSAIDS as needed for symptoms. Discussed rest and wrist support; Follow up and further management after testing if done.     No orders of the defined types were placed in th

## 2019-11-25 ENCOUNTER — HOSPITAL ENCOUNTER (OUTPATIENT)
Dept: GENERAL RADIOLOGY | Facility: HOSPITAL | Age: 59
Discharge: HOME | End: 2019-11-25
Attending: FAMILY MEDICINE
Payer: COMMERCIAL

## 2019-11-25 DIAGNOSIS — M25.531 RIGHT WRIST PAIN: ICD-10-CM

## 2019-11-25 PROCEDURE — 73110 X-RAY EXAM OF WRIST: CPT | Performed by: FAMILY MEDICINE

## 2019-12-04 ENCOUNTER — NURSE ONLY (OUTPATIENT)
Dept: ALLERGY | Facility: CLINIC | Age: 59
End: 2019-12-04
Payer: COMMERCIAL

## 2019-12-04 DIAGNOSIS — Z91.09 ENVIRONMENTAL ALLERGIES: ICD-10-CM

## 2019-12-04 PROCEDURE — 95117 IMMUNOTHERAPY INJECTIONS: CPT | Performed by: ALLERGY & IMMUNOLOGY

## 2019-12-12 RX ORDER — GLYBURIDE-METFORMIN HYDROCHLORIDE 2.5; 5 MG/1; MG/1
TABLET ORAL
Qty: 90 TABLET | Refills: 1 | Status: SHIPPED | OUTPATIENT
Start: 2019-12-12 | End: 2020-06-03

## 2019-12-13 NOTE — TELEPHONE ENCOUNTER
Refill passed per [facility] protocol.   Diabetes Medications  Protocol Criteria:  · Appointment scheduled in the past 6 months or the next 3 months  · A1C < 7.5 in the past 6 months  · Creatinine in the past 12 months  · Creatinine result < 1.5   Rece

## 2019-12-14 NOTE — TELEPHONE ENCOUNTER
Review pended refill request as it does not fall under a protocol.     Last Rx: 9/13/19  LOV: 3 weeks ago

## 2019-12-16 RX ORDER — DICYCLOMINE HCL 20 MG
TABLET ORAL
Qty: 90 TABLET | Refills: 0 | Status: SHIPPED | OUTPATIENT
Start: 2019-12-16 | End: 2020-01-14

## 2019-12-23 ENCOUNTER — NURSE ONLY (OUTPATIENT)
Dept: ALLERGY | Facility: CLINIC | Age: 59
End: 2019-12-23
Payer: COMMERCIAL

## 2019-12-23 DIAGNOSIS — J30.89 ENVIRONMENTAL AND SEASONAL ALLERGIES: ICD-10-CM

## 2019-12-23 PROCEDURE — 95165 ANTIGEN THERAPY SERVICES: CPT | Performed by: ALLERGY & IMMUNOLOGY

## 2019-12-23 PROCEDURE — 95117 IMMUNOTHERAPY INJECTIONS: CPT | Performed by: ALLERGY & IMMUNOLOGY

## 2019-12-31 ENCOUNTER — OFFICE VISIT (OUTPATIENT)
Dept: ALLERGY | Facility: CLINIC | Age: 59
End: 2019-12-31
Payer: COMMERCIAL

## 2019-12-31 VITALS
OXYGEN SATURATION: 98 % | TEMPERATURE: 98 F | HEIGHT: 67 IN | WEIGHT: 158 LBS | BODY MASS INDEX: 24.8 KG/M2 | DIASTOLIC BLOOD PRESSURE: 78 MMHG | SYSTOLIC BLOOD PRESSURE: 137 MMHG | RESPIRATION RATE: 16 BRPM | HEART RATE: 71 BPM

## 2019-12-31 DIAGNOSIS — J30.1 ALLERGIC RHINITIS DUE TO POLLEN, UNSPECIFIED SEASONALITY: Primary | ICD-10-CM

## 2019-12-31 DIAGNOSIS — H61.23 BILATERAL IMPACTED CERUMEN: ICD-10-CM

## 2019-12-31 DIAGNOSIS — Z91.048 ALLERGY TO MOLD: ICD-10-CM

## 2019-12-31 PROCEDURE — 99214 OFFICE O/P EST MOD 30 MIN: CPT | Performed by: ALLERGY & IMMUNOLOGY

## 2019-12-31 NOTE — PATIENT INSTRUCTIONS
1. AR  Continue with treatment of underlying environmental allergies with maintenance dose immunotherapy every 4 weeks to mold dust mites and cats as well as current Flonase and Allegra  No adverse events with immunotherapy in the interim    2.   Cerumen im

## 2019-12-31 NOTE — PROGRESS NOTES
Cara Stark is a 63year old male. HPI:   Patient presents with: Allergies: Pt last seen in Allergy 7/2019 for seasonal/environmental allergies and AR. Pt offers that in the colder weather bilateal ears have become \"clogged\".   Pt offers he is cristi Smokeless tobacco: Never Used    Alcohol use:  Yes      Alcohol/week: 0.0 standard drinks      Comment: social     Drug use: No       Medications (Active prior to today's visit):  Current Outpatient Medications   Medication Sig Dispense Refill   • [facility] motion is intact   Ears/Audiometry: tympanic membranes are normal bilaterally hearing is grossly intact. Bilateral cerumen impaction right greater than left.   Patient defers attempts to remove the wax with a white loop or with irrigation at this time  Nos Emma Curran MD    If medication samples were provided today, they were provided solely for patient education and training related to self administration of these medications. Teaching, instruction and sample was provided to the patient by myself.   [employer]

## 2020-01-02 ENCOUNTER — OFFICE VISIT (OUTPATIENT)
Dept: OTOLARYNGOLOGY | Facility: CLINIC | Age: 60
End: 2020-01-02
Payer: COMMERCIAL

## 2020-01-02 VITALS
DIASTOLIC BLOOD PRESSURE: 84 MMHG | HEIGHT: 67 IN | WEIGHT: 158 LBS | SYSTOLIC BLOOD PRESSURE: 138 MMHG | TEMPERATURE: 97 F | BODY MASS INDEX: 24.8 KG/M2

## 2020-01-02 DIAGNOSIS — H61.23 BILATERAL IMPACTED CERUMEN: Primary | ICD-10-CM

## 2020-01-02 PROCEDURE — 99213 OFFICE O/P EST LOW 20 MIN: CPT | Performed by: OTOLARYNGOLOGY

## 2020-01-02 NOTE — PROGRESS NOTES
Karine Jarvis is a 63year old male.   Patient presents with:  Ear Wax: both ears      HISTORY OF PRESENT ILLNESS    Patient presents for cerumen removal. No other complaints or concerns at this time    Social History    Socioeconomic History      Marital diarrhea. Endocrine Negative Cold intolerance and heat intolerance. Neuro Negative Tremors. Psych Negative Anxiety and depression. Integumentary Negative Frequent skin infections, pigment change and rash.    Hema/Lymph Negative Easy bleeding and eas Bottle, Rfl: 2  •  Fexofenadine HCl 180 MG Oral Tab, Take 180 mg by mouth daily. , Disp: , Rfl:   •  TRUEPLUS LANCETS 30G Does not apply Misc, USE EVERY DAY AS DIRECTED, Disp: 100 each, Rfl: 3  •  TRUE METRIX BLOOD GLUCOSE TEST In Vitro Strip, USE ONE STRIP

## 2020-01-03 ENCOUNTER — NURSE TRIAGE (OUTPATIENT)
Dept: FAMILY MEDICINE CLINIC | Facility: CLINIC | Age: 60
End: 2020-01-03

## 2020-01-03 ENCOUNTER — OFFICE VISIT (OUTPATIENT)
Dept: FAMILY MEDICINE CLINIC | Facility: CLINIC | Age: 60
End: 2020-01-03
Payer: COMMERCIAL

## 2020-01-03 VITALS
TEMPERATURE: 98 F | BODY MASS INDEX: 24.8 KG/M2 | HEART RATE: 77 BPM | DIASTOLIC BLOOD PRESSURE: 92 MMHG | HEIGHT: 67 IN | SYSTOLIC BLOOD PRESSURE: 158 MMHG | WEIGHT: 158 LBS

## 2020-01-03 DIAGNOSIS — M54.50 LOW BACK PAIN, UNSPECIFIED BACK PAIN LATERALITY, UNSPECIFIED CHRONICITY, UNSPECIFIED WHETHER SCIATICA PRESENT: Primary | ICD-10-CM

## 2020-01-03 LAB
BILIRUBIN: NEGATIVE
GLUCOSE (URINE DIPSTICK): NEGATIVE MG/DL
KETONES (URINE DIPSTICK): NEGATIVE MG/DL
LEUKOCYTES: NEGATIVE
MULTISTIX LOT#: NORMAL NUMERIC
NITRITE, URINE: NEGATIVE
PH, URINE: 5 (ref 4.5–8)
PROTEIN (URINE DIPSTICK): NEGATIVE MG/DL
SPECIFIC GRAVITY: 1.02 (ref 1–1.03)
URINE-COLOR: YELLOW
UROBILINOGEN,SEMI-QN: 0.2 MG/DL (ref 0–1.9)

## 2020-01-03 PROCEDURE — 99213 OFFICE O/P EST LOW 20 MIN: CPT | Performed by: PHYSICIAN ASSISTANT

## 2020-01-03 PROCEDURE — 81003 URINALYSIS AUTO W/O SCOPE: CPT | Performed by: PHYSICIAN ASSISTANT

## 2020-01-03 NOTE — TELEPHONE ENCOUNTER
Action Requested: Summary for Provider     []  Critical Lab, Recommendations Needed  [] Need Additional Advice  []   FYI    []   Need Orders  [] Need Medications Sent to Pharmacy  []  Other     SUMMARY:appt made.  X 2 days, right rib cage pain radiating to

## 2020-01-03 NOTE — PROGRESS NOTES
HPI:    Patient ID: Joanie Mccallum is a 63year old male. Patient presents for low right side pain below rib cage that travels to the back. No saddle anesthesia. No injury or trauma noted. No numbness or weakness noted in legs.  No problems with urinatio Does not apply Kit 1 kit by Does not apply route daily. 1 kit 0   • Vitamins-[employer]ipotrop[employer] (MULTI-VITAMIN HP/MINERALS) Oral Cap Take  by mouth.        Allergies:  Ciprofloxacin           HIVES   BP (!) 158/92 (BP Location: Left arm, Patient Position: Sitting) CT ABDOMEN+PELVIS(CPT=74176);  Future      Orders Placed This Encounter      POC Urinalysis, Automated Dip without microscopy (PCA and E[facility] ONLY) [45267]      Meds This Visit:  Requested Prescriptions      No prescriptions requested or ordered in this encou

## 2020-01-04 ENCOUNTER — NURSE TRIAGE (OUTPATIENT)
Dept: FAMILY MEDICINE CLINIC | Facility: CLINIC | Age: 60
End: 2020-01-04

## 2020-01-04 RX ORDER — BENZONATATE 200 MG/1
200 CAPSULE ORAL 3 TIMES DAILY PRN
Qty: 30 CAPSULE | Refills: 0 | Status: SHIPPED | OUTPATIENT
Start: 2020-01-04 | End: 2020-08-03

## 2020-01-04 NOTE — TELEPHONE ENCOUNTER
Action Requested: Summary for Provider     []  Critical Lab, Recommendations Needed  [] Need Additional Advice  []   FYI    []   Need Orders  [] Need Medications Sent to Pharmacy  []  Other     SUMMARY:  Per protocol advised office visit.   Patient Nick Peter

## 2020-01-04 NOTE — TELEPHONE ENCOUNTER
Noted. Sent benzonatate to pharmacy. 1 pill 3 times per day for cough as needed. No improvement on Monday needs to come in for office evaluation.

## 2020-01-06 ENCOUNTER — TELEPHONE (OUTPATIENT)
Dept: FAMILY MEDICINE CLINIC | Facility: CLINIC | Age: 60
End: 2020-01-06

## 2020-01-06 NOTE — TELEPHONE ENCOUNTER
Pt did read Meez message on 1/4/20 at 1:22 PM per time stamp. There is a new telephone encounter dated 1/6/20 that pt is not feeling better and wants an appointment today. See encounter for outcome.

## 2020-01-06 NOTE — TELEPHONE ENCOUNTER
Patient states he has \"massive cough,cold congestion, and back pain\" in right lower back muscle pain. Patient was seen 1/3/20 by DANG Hahn and has been taking benzonatate 200mg as ordered. Patient states he feels the same. Patient asking to have appointment today and only with Dr. Janet Logan, no appointment available.   Appointment made with Dr. Janet Logan at [address] [place] [address] for tomorrow 1/7/20 at 10:20 am.

## 2020-01-06 NOTE — TELEPHONE ENCOUNTER
Patient called in stating that he saw Nataly Tomas on 1/3 for cough, congestion and back pain. He states that there has been no improvement and wants to see Dr. Brandon Lindquist today. Does not want to see another provider. CSS transferred to triage.

## 2020-01-07 ENCOUNTER — OFFICE VISIT (OUTPATIENT)
Dept: FAMILY MEDICINE CLINIC | Facility: CLINIC | Age: 60
End: 2020-01-07
Payer: COMMERCIAL

## 2020-01-07 VITALS
RESPIRATION RATE: 18 BRPM | HEART RATE: 82 BPM | TEMPERATURE: 98 F | BODY MASS INDEX: 24.8 KG/M2 | SYSTOLIC BLOOD PRESSURE: 136 MMHG | HEIGHT: 67 IN | DIASTOLIC BLOOD PRESSURE: 85 MMHG | WEIGHT: 158 LBS

## 2020-01-07 DIAGNOSIS — J06.9 ACUTE URI: ICD-10-CM

## 2020-01-07 PROCEDURE — 99213 OFFICE O/P EST LOW 20 MIN: CPT | Performed by: FAMILY MEDICINE

## 2020-01-07 RX ORDER — AMOXICILLIN AND CLAVULANATE POTASSIUM 875; 125 MG/1; MG/1
1 TABLET, FILM COATED ORAL 2 TIMES DAILY
Qty: 20 TABLET | Refills: 0 | Status: SHIPPED | OUTPATIENT
Start: 2020-01-07 | End: 2020-04-20

## 2020-01-07 NOTE — PROGRESS NOTES
HPI:    Patient ID: Dallas Box is a 63year old male. Pt presents with cold symptoms for 7 days. Pt has had sneezing, cough, sore throat. No fevers. Pt has tried otc remedies and benzonatate without relief. Pt states no sick contacts.    Pt had some Constitutional: He appears well-developed and well-nourished. HENT:   Right Ear: Tympanic membrane and ear canal normal.   Left Ear: Tympanic membrane and ear canal normal.   Mouth/Throat: Oropharynx is clear and moist.   Neck: Neck supple.    Alex Osgood

## 2020-01-14 RX ORDER — DICYCLOMINE HCL 20 MG
TABLET ORAL
Qty: 90 TABLET | Refills: 1 | Status: SHIPPED | OUTPATIENT
Start: 2020-01-14 | End: 2020-09-07

## 2020-01-15 ENCOUNTER — TELEPHONE (OUTPATIENT)
Dept: CASE MANAGEMENT | Age: 60
End: 2020-01-15

## 2020-01-15 NOTE — TELEPHONE ENCOUNTER
Brayan Sow,    The CT you ordered for Ashley Velarde has been denied by his insurance company. There was not enough clinical documentation to support the test.    Patient has been notified and advised to f/u with your office for his future plan of care.     Thank you  Debra Toledo

## 2020-01-24 ENCOUNTER — HOSPITAL ENCOUNTER (EMERGENCY)
Facility: HOSPITAL | Age: 60
Discharge: HOME | End: 2020-01-24
Payer: COMMERCIAL

## 2020-01-24 ENCOUNTER — APPOINTMENT (OUTPATIENT)
Dept: GENERAL RADIOLOGY | Facility: HOSPITAL | Age: 60
End: 2020-01-24
Payer: COMMERCIAL

## 2020-01-24 VITALS
HEART RATE: 64 BPM | OXYGEN SATURATION: 96 % | DIASTOLIC BLOOD PRESSURE: 91 MMHG | TEMPERATURE: 97 F | BODY MASS INDEX: 23.64 KG/M2 | RESPIRATION RATE: 16 BRPM | HEIGHT: 68 IN | SYSTOLIC BLOOD PRESSURE: 132 MMHG | WEIGHT: 156 LBS

## 2020-01-24 DIAGNOSIS — R10.9 FLANK PAIN: Primary | ICD-10-CM

## 2020-01-24 LAB
BACTERIA UR QL AUTO: NEGATIVE /HPF
BILIRUB UR QL: NEGATIVE
CLARITY UR: CLEAR
COLOR UR: YELLOW
GLUCOSE UR-MCNC: NEGATIVE MG/DL
HGB UR QL STRIP.AUTO: NEGATIVE
HYALINE CASTS #/AREA URNS AUTO: 3 /LPF
KETONES UR-MCNC: NEGATIVE MG/DL
LEUKOCYTE ESTERASE UR QL STRIP.AUTO: NEGATIVE
NITRITE UR QL STRIP.AUTO: NEGATIVE
PH UR: 5 [PH] (ref 5–8)
PROT UR-MCNC: 30 MG/DL
RBC #/AREA URNS AUTO: 1 /HPF
SP GR UR STRIP: 1.03 (ref 1–1.03)
UROBILINOGEN UR STRIP-ACNC: <2
WBC #/AREA URNS AUTO: 2 /HPF

## 2020-01-24 PROCEDURE — 81001 URINALYSIS AUTO W/SCOPE: CPT | Performed by: EMERGENCY MEDICINE

## 2020-01-24 PROCEDURE — 99283 EMERGENCY DEPT VISIT LOW MDM: CPT

## 2020-01-24 PROCEDURE — 71046 X-RAY EXAM CHEST 2 VIEWS: CPT

## 2020-01-24 NOTE — ED INITIAL ASSESSMENT (HPI)
Right flank pain radiating to right mid upper back for two weeks. Denies injuries, SOB, or CP. Pt states pain started with a cough two weeks ago and hasn't improved.

## 2020-01-26 NOTE — ED PROVIDER NOTES
Patient Seen in: [facility] Emergency Department    History   No chief complaint on file. HPI    Patient presents to the ED complaining of pain to his right flank area.   He states that he has had a cough for the past 2 weeks but this is now i daily      ROS  Pertinent Positives: Flank pain  All other organ systems are reviewed and are negative. Constitutional and vital signs reviewed.       Social History and Family History elements reviewed from today, pertinent positives to the presenting p Room air, Normal     Monitor Interpretation:   normal sinus rhythm    Differential Diagnosis/ Diagnostic Considerations: Pneumothorax, rib strain, rib fracture, muscular strain, ureterolithiasis, pyelonephritis, biliary colic    Medical Record Review: I pe

## 2020-01-28 ENCOUNTER — OFFICE VISIT (OUTPATIENT)
Dept: FAMILY MEDICINE CLINIC | Facility: CLINIC | Age: 60
End: 2020-01-28
Payer: COMMERCIAL

## 2020-01-28 VITALS
WEIGHT: 158 LBS | BODY MASS INDEX: 23.95 KG/M2 | HEART RATE: 64 BPM | HEIGHT: 68 IN | SYSTOLIC BLOOD PRESSURE: 131 MMHG | RESPIRATION RATE: 18 BRPM | DIASTOLIC BLOOD PRESSURE: 81 MMHG | TEMPERATURE: 98 F

## 2020-01-28 DIAGNOSIS — M54.9 ACUTE RIGHT-SIDED BACK PAIN, UNSPECIFIED BACK LOCATION: ICD-10-CM

## 2020-01-28 PROCEDURE — 99213 OFFICE O/P EST LOW 20 MIN: CPT | Performed by: FAMILY MEDICINE

## 2020-01-28 NOTE — PROGRESS NOTES
HPI:    Patient ID: Gerardo Rodrigues is a 63year old male. Pt presents for follow up from the ER for right sided pain. Pt had been coughing but this has resolved. No fevers. No injury or trauma. No hematuria. Patient is being treated with otc remedies.  P (Other) Maternal Grandfather    • Other (Other) Paternal Grandmother    • Other (Other) Paternal Grandfather    • Asthma Other    • Heart Disease Other    • Ulcerative Colitis Other    • Diabetes Other          Smoking Status: Social History    Socioeconom No rash noted. Psychiatric: He has a normal mood and affect.  His behavior is normal.   Nursing note and vitals reviewed.          ED Course          Labs Reviewed  URINALYSIS WITH CULTURE REFLEX - Abnormal; Notable for the following components:      Resu [id number]  [phone]     Schedule an appointment as soon as possible for a visit in 3 days           Medications Prescribed:  Discharge Medication List as of 1/24/2020  5:20 PM            Review of Systems   Constitutional: Negative for fever.    Respir Reported on 1/28/2020 ) 30 capsule 0     Allergies:  Ciprofloxacin           HIVES   PHYSICAL EXAM:   Physical Exam   Constitutional: He appears well-developed and well-nourished.    HENT:   Right Ear: Tympanic membrane and ear canal normal.   Left Ear: Tym

## 2020-01-29 ENCOUNTER — NURSE ONLY (OUTPATIENT)
Dept: ALLERGY | Facility: CLINIC | Age: 60
End: 2020-01-29
Payer: COMMERCIAL

## 2020-01-29 DIAGNOSIS — J30.89 ENVIRONMENTAL AND SEASONAL ALLERGIES: ICD-10-CM

## 2020-01-29 PROCEDURE — 95117 IMMUNOTHERAPY INJECTIONS: CPT | Performed by: ALLERGY & IMMUNOLOGY

## 2020-02-05 ENCOUNTER — NURSE ONLY (OUTPATIENT)
Dept: ALLERGY | Facility: CLINIC | Age: 60
End: 2020-02-05
Payer: COMMERCIAL

## 2020-02-05 DIAGNOSIS — J30.89 ENVIRONMENTAL AND SEASONAL ALLERGIES: ICD-10-CM

## 2020-02-05 PROCEDURE — 95117 IMMUNOTHERAPY INJECTIONS: CPT | Performed by: ALLERGY & IMMUNOLOGY

## 2020-02-19 ENCOUNTER — NURSE ONLY (OUTPATIENT)
Dept: ALLERGY | Facility: CLINIC | Age: 60
End: 2020-02-19
Payer: COMMERCIAL

## 2020-02-19 DIAGNOSIS — J30.89 ENVIRONMENTAL AND SEASONAL ALLERGIES: ICD-10-CM

## 2020-02-19 PROCEDURE — 95117 IMMUNOTHERAPY INJECTIONS: CPT | Performed by: ALLERGY & IMMUNOLOGY

## 2020-03-04 ENCOUNTER — NURSE ONLY (OUTPATIENT)
Dept: ALLERGY | Facility: CLINIC | Age: 60
End: 2020-03-04
Payer: COMMERCIAL

## 2020-03-04 DIAGNOSIS — J30.89 ENVIRONMENTAL AND SEASONAL ALLERGIES: ICD-10-CM

## 2020-03-04 PROCEDURE — 95165 ANTIGEN THERAPY SERVICES: CPT | Performed by: ALLERGY & IMMUNOLOGY

## 2020-03-04 PROCEDURE — 95117 IMMUNOTHERAPY INJECTIONS: CPT | Performed by: ALLERGY & IMMUNOLOGY

## 2020-03-17 ENCOUNTER — TELEPHONE (OUTPATIENT)
Dept: FAMILY MEDICINE CLINIC | Facility: CLINIC | Age: 60
End: 2020-03-17

## 2020-03-17 RX ORDER — AMOXICILLIN 875 MG/1
875 TABLET, COATED ORAL 2 TIMES DAILY
Qty: 20 TABLET | Refills: 0 | Status: SHIPPED | OUTPATIENT
Start: 2020-03-17 | End: 2020-04-20

## 2020-03-17 NOTE — TELEPHONE ENCOUNTER
Your patient called with cough and sinus drainage      Please contact patient to determine if they should be treated over the phone by you or requires further testing.   Please refer to the \"Testing and Travel Recommendations\" provided by the health syste

## 2020-03-17 NOTE — TELEPHONE ENCOUNTER
Patient called back. He has been taking ibuprofen. Post-nasal drip mostly. Feels congested. Slight fever today and then resolved. Has dry mouth. No shortness of breath or wheezing. No travel history noted.  No exposed to someone who tested for positive for

## 2020-03-17 NOTE — TELEPHONE ENCOUNTER
Called and left a message with patient to call back to discuss symptoms and if he can be treated over the phone.

## 2020-03-25 ENCOUNTER — NURSE ONLY (OUTPATIENT)
Dept: ALLERGY | Facility: CLINIC | Age: 60
End: 2020-03-25
Payer: COMMERCIAL

## 2020-03-25 ENCOUNTER — TELEPHONE (OUTPATIENT)
Dept: FAMILY MEDICINE CLINIC | Facility: CLINIC | Age: 60
End: 2020-03-25

## 2020-03-25 DIAGNOSIS — E11.9 CONTROLLED TYPE 2 DIABETES MELLITUS WITHOUT COMPLICATION, WITHOUT LONG-TERM CURRENT USE OF INSULIN (HCC): ICD-10-CM

## 2020-03-25 DIAGNOSIS — Z00.00 ROUTINE PHYSICAL EXAMINATION: Primary | ICD-10-CM

## 2020-03-25 DIAGNOSIS — J30.89 ENVIRONMENTAL AND SEASONAL ALLERGIES: ICD-10-CM

## 2020-03-25 PROCEDURE — 95165 ANTIGEN THERAPY SERVICES: CPT | Performed by: ALLERGY & IMMUNOLOGY

## 2020-03-25 PROCEDURE — 95117 IMMUNOTHERAPY INJECTIONS: CPT | Performed by: ALLERGY & IMMUNOLOGY

## 2020-03-25 NOTE — TELEPHONE ENCOUNTER
Message noted. Order generated for blood testing and sent. Please notify pt. That he can do check up and lab work at a later date- perhaps in a month. To make appointment at that time and do blood work prior to appointment if desired.

## 2020-03-25 NOTE — TELEPHONE ENCOUNTER
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 Patient states he does his blood work 2 times a year.

## 2020-03-27 ENCOUNTER — APPOINTMENT (OUTPATIENT)
Dept: LAB | Facility: HOSPITAL | Age: 60
End: 2020-03-27
Attending: FAMILY MEDICINE
Payer: COMMERCIAL

## 2020-03-27 DIAGNOSIS — Z00.00 ROUTINE PHYSICAL EXAMINATION: ICD-10-CM

## 2020-03-27 DIAGNOSIS — E11.9 CONTROLLED TYPE 2 DIABETES MELLITUS WITHOUT COMPLICATION, WITHOUT LONG-TERM CURRENT USE OF INSULIN (HCC): ICD-10-CM

## 2020-03-27 LAB
ALBUMIN SERPL-MCNC: 3.9 G/DL (ref 3.4–5)
ALBUMIN/GLOB SERPL: 1.1 {RATIO} (ref 1–2)
ALP LIVER SERPL-CCNC: 60 U/L (ref 45–117)
ALT SERPL-CCNC: 32 U/L (ref 16–61)
ANION GAP SERPL CALC-SCNC: 6 MMOL/L (ref 0–18)
AST SERPL-CCNC: 10 U/L (ref 15–37)
BILIRUB SERPL-MCNC: 0.5 MG/DL (ref 0.1–2)
BUN BLD-MCNC: 18 MG/DL (ref 7–18)
BUN/CREAT SERPL: 22.5 (ref 10–20)
CALCIUM BLD-MCNC: 9 MG/DL (ref 8.5–10.1)
CHLORIDE SERPL-SCNC: 104 MMOL/L (ref 98–112)
CHOLEST SMN-MCNC: 178 MG/DL (ref ?–200)
CO2 SERPL-SCNC: 30 MMOL/L (ref 21–32)
COMPLEXED PSA SERPL-MCNC: 0.58 NG/ML (ref ?–4)
CREAT BLD-MCNC: 0.8 MG/DL (ref 0.7–1.3)
DEPRECATED RDW RBC AUTO: 41.1 FL (ref 35.1–46.3)
ERYTHROCYTE [DISTWIDTH] IN BLOOD BY AUTOMATED COUNT: 12.3 % (ref 11–15)
EST. AVERAGE GLUCOSE BLD GHB EST-MCNC: 151 MG/DL (ref 68–126)
GLOBULIN PLAS-MCNC: 3.7 G/DL (ref 2.8–4.4)
GLUCOSE BLD-MCNC: 158 MG/DL (ref 70–99)
HBA1C MFR BLD HPLC: 6.9 % (ref ?–5.7)
HCT VFR BLD AUTO: 49.9 % (ref 39–53)
HDLC SERPL-MCNC: 43 MG/DL (ref 40–59)
HGB BLD-MCNC: 17.1 G/DL (ref 13–17.5)
LDLC SERPL CALC-MCNC: 118 MG/DL (ref ?–100)
M PROTEIN MFR SERPL ELPH: 7.6 G/DL (ref 6.4–8.2)
MCH RBC QN AUTO: 31.3 PG (ref 26–34)
MCHC RBC AUTO-ENTMCNC: 34.3 G/DL (ref 31–37)
MCV RBC AUTO: 91.4 FL (ref 80–100)
NONHDLC SERPL-MCNC: 135 MG/DL (ref ?–130)
OSMOLALITY SERPL CALC.SUM OF ELEC: 295 MOSM/KG (ref 275–295)
PATIENT FASTING Y/N/NP: YES
PATIENT FASTING Y/N/NP: YES
PLATELET # BLD AUTO: 230 10(3)UL (ref 150–450)
POTASSIUM SERPL-SCNC: 3.9 MMOL/L (ref 3.5–5.1)
RBC # BLD AUTO: 5.46 X10(6)UL (ref 4.3–5.7)
SODIUM SERPL-SCNC: 140 MMOL/L (ref 136–145)
TRIGL SERPL-MCNC: 87 MG/DL (ref 30–149)
VLDLC SERPL CALC-MCNC: 17 MG/DL (ref 0–30)
WBC # BLD AUTO: 7.1 X10(3) UL (ref 4–11)

## 2020-03-27 PROCEDURE — 85027 COMPLETE CBC AUTOMATED: CPT

## 2020-03-27 PROCEDURE — 80053 COMPREHEN METABOLIC PANEL: CPT

## 2020-03-27 PROCEDURE — 83036 HEMOGLOBIN GLYCOSYLATED A1C: CPT

## 2020-03-27 PROCEDURE — 80061 LIPID PANEL: CPT

## 2020-03-27 PROCEDURE — 36415 COLL VENOUS BLD VENIPUNCTURE: CPT

## 2020-04-02 ENCOUNTER — NURSE TRIAGE (OUTPATIENT)
Dept: FAMILY MEDICINE CLINIC | Facility: CLINIC | Age: 60
End: 2020-04-02

## 2020-04-02 DIAGNOSIS — R19.7 DIARRHEA, UNSPECIFIED TYPE: ICD-10-CM

## 2020-04-02 PROCEDURE — 99213 OFFICE O/P EST LOW 20 MIN: CPT | Performed by: FAMILY MEDICINE

## 2020-04-02 NOTE — TELEPHONE ENCOUNTER
Action Requested: Summary for Provider     []  Critical Lab, Recommendations Needed  [] Need Additional Advice  []   FYI    []   Need Orders  [] Need Medications Sent to Pharmacy  []  Other     SUMMARY: pt c/o diarrhea 3/28/20 since finishing abx.  Denies a

## 2020-04-02 NOTE — TELEPHONE ENCOUNTER
Virtual/Telephone Check-In    Nedra Moss verbally consents to a [employer] on 04/02/20. Patient understands and accepts financial responsibility for any deductible, co-insurance and/or co-pays associated with this service.     D

## 2020-04-20 ENCOUNTER — TELEPHONE (OUTPATIENT)
Dept: FAMILY MEDICINE CLINIC | Facility: CLINIC | Age: 60
End: 2020-04-20

## 2020-04-20 DIAGNOSIS — R19.7 DIARRHEA, UNSPECIFIED TYPE: Primary | ICD-10-CM

## 2020-04-20 PROCEDURE — 99213 OFFICE O/P EST LOW 20 MIN: CPT | Performed by: FAMILY MEDICINE

## 2020-04-20 NOTE — TELEPHONE ENCOUNTER
Virtual Telephone Check-In    Lou Adair verbally consents to a Virtual/Telephone Check-In visit on 04/20/20. Patient understands and accepts financial responsibility for any deductible, co-insurance and/or co-pays associated with this service.     D SHAKE LIQUID AND USE 2 SPRAYS IN EACH NOSTRIL DAILY, Disp: 3 Bottle, Rfl: 2  •  Fexofenadine HCl 180 MG Oral Tab, Take 180 mg by mouth daily. , Disp: , Rfl:   •  TRUEPLUS LANCETS 30G Does not apply Misc, USE EVERY DAY AS DIRECTED, Disp: 100 each, Rfl: 3  •

## 2020-04-20 NOTE — TELEPHONE ENCOUNTER
Spoke with patient--reports, \"still not feeling well. After I talked to Dr. Dl Zavala in the beginning of the month, I had one or two more diarrheas, then it went away for a while. I had one episode of diarrhea Thursday and again on Sunday.  I did take Immodium

## 2020-04-21 ENCOUNTER — LAB ENCOUNTER (OUTPATIENT)
Dept: LAB | Facility: HOSPITAL | Age: 60
End: 2020-04-21
Attending: FAMILY MEDICINE
Payer: COMMERCIAL

## 2020-04-21 DIAGNOSIS — R19.7 DIARRHEA, UNSPECIFIED TYPE: ICD-10-CM

## 2020-04-21 PROCEDURE — 87046 STOOL CULTR AEROBIC BACT EA: CPT

## 2020-04-21 PROCEDURE — 87493 C DIFF AMPLIFIED PROBE: CPT

## 2020-04-21 PROCEDURE — 87045 FECES CULTURE AEROBIC BACT: CPT

## 2020-04-21 PROCEDURE — 87427 SHIGA-LIKE TOXIN AG IA: CPT

## 2020-04-22 ENCOUNTER — NURSE ONLY (OUTPATIENT)
Dept: ALLERGY | Facility: CLINIC | Age: 60
End: 2020-04-22
Payer: COMMERCIAL

## 2020-04-22 DIAGNOSIS — J30.89 ENVIRONMENTAL AND SEASONAL ALLERGIES: ICD-10-CM

## 2020-04-22 PROCEDURE — 95117 IMMUNOTHERAPY INJECTIONS: CPT | Performed by: ALLERGY & IMMUNOLOGY

## 2020-05-06 ENCOUNTER — TELEMEDICINE (OUTPATIENT)
Dept: ALLERGY | Facility: CLINIC | Age: 60
End: 2020-05-06
Payer: COMMERCIAL

## 2020-05-06 DIAGNOSIS — J30.81 ALLERGIC RHINITIS DUE TO ANIMAL (CAT) (DOG) HAIR AND DANDER: ICD-10-CM

## 2020-05-06 DIAGNOSIS — H69.83 DYSFUNCTION OF BOTH EUSTACHIAN TUBES: ICD-10-CM

## 2020-05-06 DIAGNOSIS — J30.1 ALLERGIC RHINITIS DUE TO POLLEN, UNSPECIFIED SEASONALITY: Primary | ICD-10-CM

## 2020-05-06 DIAGNOSIS — Z91.048 ALLERGY TO MOLD: ICD-10-CM

## 2020-05-06 PROCEDURE — 99214 OFFICE O/P EST MOD 30 MIN: CPT | Performed by: ALLERGY & IMMUNOLOGY

## 2020-05-06 NOTE — PROGRESS NOTES
Tony Maldonado is a 63year old male. HPI:   No chief complaint on file.     Patient is a 50-year-old male who presents for follow-up via tele-med visit due to current coronavirus pandemic    This visit is conducted using telemedicine with live interacti 0.0 standard drinks      Comment: social     Drug use: No       Medications (Active prior to today's visit):  Current Outpatient Medications   Medication Sig Dispense Refill   • DICYCLOMINE HCL 20 MG Oral Tab TAKE 1 TABLET(20 MG) BY MOUTH THREE TIMES DAILY acute distress noted  Head/Face: NC/Atraumatic  Eyes/Vision: conjunctiva and lids are normal   Speaking in full sentences no increased work of breathing  A&O x 3    Skin/Hair: no unusual rashes present   Extremities: no edema, cyanosis, or clubbing     ASS in regards to medication administration and dosing and potential side effects.  Teaching was provided via the teach back method

## 2020-05-06 NOTE — PATIENT INSTRUCTIONS
1. AR  Continue with current regimen including maintenance dose immunotherapy every 4 weeks to mold dust mites and cats. 1 cat in the home. Continue with Flonase and Allegra.   May add Sudafed 30 mg every 6 hours as needed if congestion is prominent or ma

## 2020-05-20 ENCOUNTER — NURSE ONLY (OUTPATIENT)
Dept: ALLERGY | Facility: CLINIC | Age: 60
End: 2020-05-20
Payer: COMMERCIAL

## 2020-05-20 DIAGNOSIS — J30.89 ENVIRONMENTAL AND SEASONAL ALLERGIES: ICD-10-CM

## 2020-05-20 PROCEDURE — 95117 IMMUNOTHERAPY INJECTIONS: CPT | Performed by: ALLERGY & IMMUNOLOGY

## 2020-05-26 RX ORDER — GLUCOSAM/CHON-MSM1/C/MANG/BOSW 500-416.6
TABLET ORAL
Qty: 100 EACH | Refills: 3 | Status: SHIPPED | OUTPATIENT
Start: 2020-05-26 | End: 2021-05-29

## 2020-05-26 NOTE — TELEPHONE ENCOUNTER
Message noted: Chart reviewed and may refill diabetic supplies as requested. Prescription sent to listed pharmacy. Pharmacy to notify patient.

## 2020-05-30 ENCOUNTER — MED REC SCAN ONLY (OUTPATIENT)
Dept: FAMILY MEDICINE CLINIC | Facility: CLINIC | Age: 60
End: 2020-05-30

## 2020-06-03 RX ORDER — GLYBURIDE-METFORMIN HYDROCHLORIDE 2.5; 5 MG/1; MG/1
TABLET ORAL
Qty: 90 TABLET | Refills: 1 | Status: SHIPPED | OUTPATIENT
Start: 2020-06-03 | End: 2020-11-22

## 2020-06-03 NOTE — TELEPHONE ENCOUNTER
Message noted: Chart reviewed and may refill medication times one 90 day supply as requested with 1 additional refill. Prescription sent to listed pharmacy. Pharmacy to notify patient.  Pt notified through [facility]

## 2020-06-08 ENCOUNTER — OFFICE VISIT (OUTPATIENT)
Dept: FAMILY MEDICINE CLINIC | Facility: CLINIC | Age: 60
End: 2020-06-08
Payer: COMMERCIAL

## 2020-06-08 VITALS
SYSTOLIC BLOOD PRESSURE: 132 MMHG | WEIGHT: 152 LBS | HEIGHT: 68 IN | TEMPERATURE: 98 F | BODY MASS INDEX: 23.04 KG/M2 | DIASTOLIC BLOOD PRESSURE: 82 MMHG | HEART RATE: 92 BPM | RESPIRATION RATE: 20 BRPM

## 2020-06-08 DIAGNOSIS — E11.9 CONTROLLED TYPE 2 DIABETES MELLITUS WITHOUT COMPLICATION, WITHOUT LONG-TERM CURRENT USE OF INSULIN (HCC): Primary | ICD-10-CM

## 2020-06-08 DIAGNOSIS — Z00.00 ROUTINE PHYSICAL EXAMINATION: ICD-10-CM

## 2020-06-08 PROCEDURE — 99396 PREV VISIT EST AGE 40-64: CPT | Performed by: FAMILY MEDICINE

## 2020-06-08 NOTE — PROGRESS NOTES
HPI:    Patient ID: Caitlin Richter is a 63year old male. Patient is here for routine physical exam and follow up on chronic medical issues- diabetes. No acute issues. Patient is requesting blood testing.  Diet and exercise have been fairly good- has bee BLOOD GLUCOSE MONITORING 100 strip 3   • FLUTICASONE PROPIONATE 50 MCG/ACT Nasal Suspension SHAKE LIQUID AND USE 2 SPRAYS IN EACH NOSTRIL DAILY 3 Bottle 2   • Fexofenadine HCl 180 MG Oral Tab Take 180 mg by mouth daily.      • TRUE METRIX BLOOD GLUCOSE TEST follow up.     Controlled type 2 diabetes mellitus without complication, without long-term current use of insulin: reviewed labs; had DM eye exam  - Will check DM labs in 3 months and follow up and further management after lab work; Medications reviewed and

## 2020-06-17 ENCOUNTER — TELEPHONE (OUTPATIENT)
Dept: FAMILY MEDICINE CLINIC | Facility: CLINIC | Age: 60
End: 2020-06-17

## 2020-06-17 ENCOUNTER — NURSE ONLY (OUTPATIENT)
Dept: ALLERGY | Facility: CLINIC | Age: 60
End: 2020-06-17
Payer: COMMERCIAL

## 2020-06-17 DIAGNOSIS — J30.89 ENVIRONMENTAL AND SEASONAL ALLERGIES: ICD-10-CM

## 2020-06-17 PROCEDURE — 95117 IMMUNOTHERAPY INJECTIONS: CPT | Performed by: ALLERGY & IMMUNOLOGY

## 2020-06-17 NOTE — TELEPHONE ENCOUNTER
pt. requesting to speak to nurse about getting his My chart Info updated to show that pt has had his dialated Retinal Eye Exam done on 5/27/2020 by [facility] in [facility].

## 2020-06-30 ENCOUNTER — TELEPHONE (OUTPATIENT)
Dept: FAMILY MEDICINE CLINIC | Facility: CLINIC | Age: 60
End: 2020-06-30

## 2020-06-30 NOTE — TELEPHONE ENCOUNTER
Spoke with patient--reports he is returning to work next week ([employer])--asking Dr. Clay Lob recommendations:    \"I have allergies--what's the best mask to wear for me, for comfort and effectiveness.  Should I wear a cloth mask or one of those blue di

## 2020-07-15 ENCOUNTER — NURSE ONLY (OUTPATIENT)
Dept: ALLERGY | Facility: CLINIC | Age: 60
End: 2020-07-15
Payer: COMMERCIAL

## 2020-07-15 DIAGNOSIS — J30.89 ENVIRONMENTAL AND SEASONAL ALLERGIES: ICD-10-CM

## 2020-07-15 PROCEDURE — 95117 IMMUNOTHERAPY INJECTIONS: CPT | Performed by: ALLERGY & IMMUNOLOGY

## 2020-07-16 ENCOUNTER — NURSE TRIAGE (OUTPATIENT)
Dept: FAMILY MEDICINE CLINIC | Facility: CLINIC | Age: 60
End: 2020-07-16

## 2020-07-16 ENCOUNTER — TELEMEDICINE (OUTPATIENT)
Dept: FAMILY MEDICINE CLINIC | Facility: CLINIC | Age: 60
End: 2020-07-16

## 2020-07-16 DIAGNOSIS — R05.9 COUGH: ICD-10-CM

## 2020-07-16 PROCEDURE — 99213 OFFICE O/P EST LOW 20 MIN: CPT | Performed by: FAMILY MEDICINE

## 2020-07-16 RX ORDER — AMOXICILLIN 875 MG/1
875 TABLET, COATED ORAL 2 TIMES DAILY
Qty: 20 TABLET | Refills: 0 | Status: SHIPPED | OUTPATIENT
Start: 2020-07-16 | End: 2021-01-07 | Stop reason: ALTCHOICE

## 2020-07-16 NOTE — TELEPHONE ENCOUNTER
Action Requested: Summary for Provider     []  Critical Lab, Recommendations Needed  [] Need Additional Advice  []   FYI    []   Need Orders  [] Need Medications Sent to Pharmacy  []  Other     SUMMARY: Spoke with the patient who reports he has a post nasa

## 2020-07-16 NOTE — PROGRESS NOTES
HPI:    Patient ID: Dallas Box is a 63year old male. This visit is conducted using Telemedicine with live, interactive video and audio during this Coronavirus pandemic.     Please note that the following visit was completed using two-way, real-time pain.   Gastrointestinal: Negative for diarrhea. Current Outpatient Medications   Medication Sig Dispense Refill   • amoxicillin 875 MG Oral Tab Take 1 tablet (875 mg total) by mouth 2 (two) times daily.  20 tablet 0   • GLYBURIDE-METFORMIN 2.5-5 worse. Patient verbalized understanding of recommendations and agrees to plan. No orders of the defined types were placed in this encounter.       Meds This Visit:  Requested Prescriptions     Signed Prescriptions Disp Refills   • amoxicillin 875 MG

## 2020-07-20 RX ORDER — CALCIUM CITRATE/VITAMIN D3 200MG-6.25
TABLET ORAL
Qty: 100 STRIP | Refills: 3 | Status: SHIPPED | OUTPATIENT
Start: 2020-07-20 | End: 2021-07-12

## 2020-07-25 ENCOUNTER — TELEPHONE (OUTPATIENT)
Dept: FAMILY MEDICINE CLINIC | Facility: CLINIC | Age: 60
End: 2020-07-25

## 2020-07-25 RX ORDER — AMOXICILLIN AND CLAVULANATE POTASSIUM 875; 125 MG/1; MG/1
1 TABLET, FILM COATED ORAL 2 TIMES DAILY
Qty: 20 TABLET | Refills: 0 | Status: SHIPPED | OUTPATIENT
Start: 2020-07-25 | End: 2021-06-15

## 2020-07-25 NOTE — TELEPHONE ENCOUNTER
Message noted. Pt contacted and pt not better with amoxicillin. Pt requesting augmentin for sinuses. No fevers or COVID 19 exposure. May start augmentin as requested. Erx sent to listed pharmacy.  To follow up for appointment if not better; Patient verb

## 2020-07-25 NOTE — TELEPHONE ENCOUNTER
Tele visit 7-  Still not feeling any better. Today is day 9 of 10 day course of Amoxicillin  Report that he still has dry cough with post nasal drip. Denies sinus headache or pressure. Has been taking Claritin for the past 4 days.     Pharmacy lakhwinder

## 2020-07-28 ENCOUNTER — TELEPHONE (OUTPATIENT)
Dept: FAMILY MEDICINE CLINIC | Facility: CLINIC | Age: 60
End: 2020-07-28

## 2020-07-28 DIAGNOSIS — J02.9 SORE THROAT: ICD-10-CM

## 2020-07-28 DIAGNOSIS — R05.9 COUGH: Primary | ICD-10-CM

## 2020-07-28 NOTE — TELEPHONE ENCOUNTER
Pt calling to update Dr Moises Campbell. Pt states although he is coughing less, he is still coughing and has post nasal drip. Pt is taking antibx as prescribed. Pt states he has left over benzonatate from January and asking if he should try that.     Pt sonia

## 2020-07-28 NOTE — TELEPHONE ENCOUNTER
Can take benzonatate for cough if he has this. To monitor symptoms and call if worse or not better; Follow up as needed.

## 2020-07-28 NOTE — TELEPHONE ENCOUNTER
Patient returned call. Relayed MD message below. Patient will take the benzonatate that he has left at home and recommended he call back if no improvement.

## 2020-07-29 ENCOUNTER — LAB ENCOUNTER (OUTPATIENT)
Dept: LAB | Facility: HOSPITAL | Age: 60
End: 2020-07-29
Attending: FAMILY MEDICINE
Payer: COMMERCIAL

## 2020-07-29 DIAGNOSIS — J02.9 SORE THROAT: ICD-10-CM

## 2020-07-29 DIAGNOSIS — R05.9 COUGH: ICD-10-CM

## 2020-07-29 NOTE — TELEPHONE ENCOUNTER
After discussion with patient, will check for COVID 19 as discussed; To call if any significant symptoms. Follow up and further management after testing. To practice self isolation and quarantining as discussed.  To continue social distancing and good hygi

## 2020-07-29 NOTE — TELEPHONE ENCOUNTER
Spoke with patient--reports he has been taking Augmentin and Benzonatate as directed, but has persistent sore throat and dry cough. Patient also has been taking \"Vitamin C drink and drinking lots of water. \"    Patient denies fever, nausea, vomiting, diar

## 2020-07-30 NOTE — TELEPHONE ENCOUNTER
Patient informed of provider's response/recommendation below and patient voiced understating and agrees.

## 2020-07-30 NOTE — TELEPHONE ENCOUNTER
Pt states is having PND again which causes him to cough. Asking if needs a decongestant or if Dr José Miguel Proctor can recommend something for the PND (denies runny/stuffy nose). States currently only taking Augmentin and benzonatate for symptoms.  Please advise

## 2020-08-01 LAB — SARS-COV-2 BY PCR: NOT DETECTED

## 2020-08-12 ENCOUNTER — NURSE ONLY (OUTPATIENT)
Dept: ALLERGY | Facility: CLINIC | Age: 60
End: 2020-08-12
Payer: COMMERCIAL

## 2020-08-12 DIAGNOSIS — J30.89 ENVIRONMENTAL AND SEASONAL ALLERGIES: ICD-10-CM

## 2020-08-12 PROCEDURE — 95117 IMMUNOTHERAPY INJECTIONS: CPT | Performed by: ALLERGY & IMMUNOLOGY

## 2020-08-12 PROCEDURE — 95165 ANTIGEN THERAPY SERVICES: CPT | Performed by: ALLERGY & IMMUNOLOGY

## 2020-08-20 RX ORDER — FLUTICASONE PROPIONATE 50 MCG
SPRAY, SUSPENSION (ML) NASAL
Qty: 48 G | Refills: 0 | Status: SHIPPED | OUTPATIENT
Start: 2020-08-20 | End: 2020-11-09

## 2020-08-20 NOTE — TELEPHONE ENCOUNTER
Refill requested for   Name from pharmacy: FLUTICASONE 50MCG NASAL SP (120) RX          Will file in chart as: FLUTICASONE PROPIONATE 50 MCG/ACT Nasal Suspension         Sig: SHAKE LIQUID AND USE 2 SPRAYS IN EACH NOSTRIL DAILY    Disp:  48 g    Refills:  0

## 2020-09-07 RX ORDER — DICYCLOMINE HCL 20 MG
TABLET ORAL
Qty: 90 TABLET | Refills: 1 | Status: SHIPPED | OUTPATIENT
Start: 2020-09-07 | End: 2021-03-05

## 2020-09-08 ENCOUNTER — NURSE ONLY (OUTPATIENT)
Dept: ALLERGY | Facility: CLINIC | Age: 60
End: 2020-09-08
Payer: COMMERCIAL

## 2020-09-08 ENCOUNTER — LAB ENCOUNTER (OUTPATIENT)
Dept: LAB | Age: 60
End: 2020-09-08
Attending: FAMILY MEDICINE
Payer: COMMERCIAL

## 2020-09-08 DIAGNOSIS — Z00.00 ROUTINE PHYSICAL EXAMINATION: ICD-10-CM

## 2020-09-08 DIAGNOSIS — E11.9 CONTROLLED TYPE 2 DIABETES MELLITUS WITHOUT COMPLICATION, WITHOUT LONG-TERM CURRENT USE OF INSULIN (HCC): ICD-10-CM

## 2020-09-08 DIAGNOSIS — J30.89 ENVIRONMENTAL AND SEASONAL ALLERGIES: ICD-10-CM

## 2020-09-08 LAB
ALBUMIN SERPL-MCNC: 3.9 G/DL (ref 3.4–5)
ALBUMIN/GLOB SERPL: 1.1 {RATIO} (ref 1–2)
ALP LIVER SERPL-CCNC: 66 U/L (ref 45–117)
ALT SERPL-CCNC: 31 U/L (ref 16–61)
ANION GAP SERPL CALC-SCNC: 6 MMOL/L (ref 0–18)
AST SERPL-CCNC: 12 U/L (ref 15–37)
BILIRUB SERPL-MCNC: 0.5 MG/DL (ref 0.1–2)
BUN BLD-MCNC: 16 MG/DL (ref 7–18)
BUN/CREAT SERPL: 15.7 (ref 10–20)
CALCIUM BLD-MCNC: 9.3 MG/DL (ref 8.5–10.1)
CHLORIDE SERPL-SCNC: 105 MMOL/L (ref 98–112)
CHOLEST SMN-MCNC: 153 MG/DL (ref ?–200)
CO2 SERPL-SCNC: 30 MMOL/L (ref 21–32)
CREAT BLD-MCNC: 1.02 MG/DL (ref 0.7–1.3)
CREAT UR-SCNC: 184 MG/DL
EST. AVERAGE GLUCOSE BLD GHB EST-MCNC: 146 MG/DL (ref 68–126)
GLOBULIN PLAS-MCNC: 3.4 G/DL (ref 2.8–4.4)
GLUCOSE BLD-MCNC: 149 MG/DL (ref 70–99)
HBA1C MFR BLD HPLC: 6.7 % (ref ?–5.7)
HDLC SERPL-MCNC: 50 MG/DL (ref 40–59)
LDLC SERPL CALC-MCNC: 92 MG/DL (ref ?–100)
M PROTEIN MFR SERPL ELPH: 7.3 G/DL (ref 6.4–8.2)
MICROALBUMIN UR-MCNC: 1.05 MG/DL
MICROALBUMIN/CREAT 24H UR-RTO: 5.7 UG/MG (ref ?–30)
NONHDLC SERPL-MCNC: 103 MG/DL (ref ?–130)
OSMOLALITY SERPL CALC.SUM OF ELEC: 296 MOSM/KG (ref 275–295)
PATIENT FASTING Y/N/NP: YES
PATIENT FASTING Y/N/NP: YES
POTASSIUM SERPL-SCNC: 4.2 MMOL/L (ref 3.5–5.1)
SODIUM SERPL-SCNC: 141 MMOL/L (ref 136–145)
TRIGL SERPL-MCNC: 57 MG/DL (ref 30–149)
VLDLC SERPL CALC-MCNC: 11 MG/DL (ref 0–30)

## 2020-09-08 PROCEDURE — 80053 COMPREHEN METABOLIC PANEL: CPT

## 2020-09-08 PROCEDURE — 82570 ASSAY OF URINE CREATININE: CPT

## 2020-09-08 PROCEDURE — 80061 LIPID PANEL: CPT

## 2020-09-08 PROCEDURE — 82043 UR ALBUMIN QUANTITATIVE: CPT

## 2020-09-08 PROCEDURE — 83036 HEMOGLOBIN GLYCOSYLATED A1C: CPT

## 2020-09-08 PROCEDURE — 36415 COLL VENOUS BLD VENIPUNCTURE: CPT

## 2020-09-08 PROCEDURE — 95117 IMMUNOTHERAPY INJECTIONS: CPT | Performed by: ALLERGY & IMMUNOLOGY

## 2020-09-08 NOTE — TELEPHONE ENCOUNTER
Message noted: Chart reviewed and may refill medication times one 90 day supply as requested with 1 additional refill. Prescription sent to listed pharmacy. Pharmacy to notify patient.  Pt notified through [facility]

## 2020-10-06 ENCOUNTER — TELEMEDICINE (OUTPATIENT)
Dept: FAMILY MEDICINE CLINIC | Facility: CLINIC | Age: 60
End: 2020-10-06
Payer: COMMERCIAL

## 2020-10-06 ENCOUNTER — TELEPHONE (OUTPATIENT)
Dept: FAMILY MEDICINE CLINIC | Facility: CLINIC | Age: 60
End: 2020-10-06

## 2020-10-06 DIAGNOSIS — R09.81 SINUS CONGESTION: ICD-10-CM

## 2020-10-06 PROCEDURE — 99213 OFFICE O/P EST LOW 20 MIN: CPT | Performed by: FAMILY MEDICINE

## 2020-10-06 RX ORDER — AMOXICILLIN 875 MG/1
875 TABLET, COATED ORAL 2 TIMES DAILY
Qty: 20 TABLET | Refills: 0 | Status: SHIPPED | OUTPATIENT
Start: 2020-10-06 | End: 2021-01-07 | Stop reason: ALTCHOICE

## 2020-10-06 NOTE — TELEPHONE ENCOUNTER
Action Requested: Summary for Provider     []  Critical Lab, Recommendations Needed  [] Need Additional Advice  []   FYI    []   Need Orders  [] Need Medications Sent to Pharmacy  []  Other     SUMMARY: virtual visit scheduled today with Dr Myah Banks for leonor

## 2020-10-06 NOTE — PROGRESS NOTES
HPI:    Patient ID: Sacnhez Gibbons is a 62year old male. This visit is conducted using Telemedicine with live, interactive video and audio during this Coronavirus pandemic.     Please note that the following visit was completed using two-way, real-time Current Outpatient Medications   Medication Sig Dispense Refill   • amoxicillin 875 MG Oral Tab Take 1 tablet (875 mg total) by mouth 2 (two) times daily.  20 tablet 0   • DICYCLOMINE HCL 20 MG Oral Tab TAKE 1 TABLET(20 MG) BY MOUTH THREE TIMES D ASSESSMENT/PLAN:   Sinus congestion with cold symptoms: no COVID exposure:  VIDEO VISIT done via [employer]    - After discussion with patient, amoxicillin as directed; Over the counter remedies discussed; To call if worse or not better;  Follow up in one w

## 2020-10-07 ENCOUNTER — NURSE ONLY (OUTPATIENT)
Dept: ALLERGY | Facility: CLINIC | Age: 60
End: 2020-10-07
Payer: COMMERCIAL

## 2020-10-07 DIAGNOSIS — J30.89 ENVIRONMENTAL AND SEASONAL ALLERGIES: ICD-10-CM

## 2020-10-07 PROCEDURE — 95117 IMMUNOTHERAPY INJECTIONS: CPT | Performed by: ALLERGY & IMMUNOLOGY

## 2020-11-04 ENCOUNTER — NURSE ONLY (OUTPATIENT)
Dept: ALLERGY | Facility: CLINIC | Age: 60
End: 2020-11-04
Payer: COMMERCIAL

## 2020-11-04 DIAGNOSIS — J30.89 ENVIRONMENTAL AND SEASONAL ALLERGIES: ICD-10-CM

## 2020-11-04 PROCEDURE — 95117 IMMUNOTHERAPY INJECTIONS: CPT | Performed by: ALLERGY & IMMUNOLOGY

## 2020-11-09 RX ORDER — FLUTICASONE PROPIONATE 50 MCG
SPRAY, SUSPENSION (ML) NASAL
Qty: 1 INHALER | Refills: 0 | Status: SHIPPED | OUTPATIENT
Start: 2020-11-09 | End: 2021-02-12

## 2020-11-09 NOTE — TELEPHONE ENCOUNTER
Refill requested for    Name from pharmacy: FLUTICASONE 50MCG NASAL SP (120) RX         Will file in chart as: FLUTICASONE PROPIONATE 50 MCG/ACT Nasal Suspension    Sig: SHAKE LIQUID AND USE 2 SPRAYS IN EACH NOSTRIL DAILY    Disp:  48 g    Refills:  0 (Pha

## 2020-11-22 RX ORDER — GLYBURIDE-METFORMIN HYDROCHLORIDE 2.5; 5 MG/1; MG/1
TABLET ORAL
Qty: 90 TABLET | Refills: 1 | Status: SHIPPED | OUTPATIENT
Start: 2020-11-22 | End: 2021-06-12

## 2020-11-23 NOTE — TELEPHONE ENCOUNTER
Message noted: Chart reviewed and may refill medication times one 90 day supply as requested with 1 additional refill. Prescription sent to listed pharmacy. Pharmacy to notify patient.  Pt notified through [facility]

## 2020-12-02 ENCOUNTER — NURSE ONLY (OUTPATIENT)
Dept: ALLERGY | Facility: CLINIC | Age: 60
End: 2020-12-02
Payer: COMMERCIAL

## 2020-12-02 ENCOUNTER — OFFICE VISIT (OUTPATIENT)
Dept: ALLERGY | Facility: CLINIC | Age: 60
End: 2020-12-02
Payer: COMMERCIAL

## 2020-12-02 VITALS
HEART RATE: 78 BPM | RESPIRATION RATE: 18 BRPM | DIASTOLIC BLOOD PRESSURE: 88 MMHG | OXYGEN SATURATION: 99 % | SYSTOLIC BLOOD PRESSURE: 147 MMHG

## 2020-12-02 DIAGNOSIS — J30.89 ENVIRONMENTAL AND SEASONAL ALLERGIES: ICD-10-CM

## 2020-12-02 DIAGNOSIS — Z91.048 ALLERGY TO MOLD: ICD-10-CM

## 2020-12-02 DIAGNOSIS — H69.83 DYSFUNCTION OF BOTH EUSTACHIAN TUBES: ICD-10-CM

## 2020-12-02 DIAGNOSIS — J30.1 ALLERGIC RHINITIS DUE TO POLLEN, UNSPECIFIED SEASONALITY: Primary | ICD-10-CM

## 2020-12-02 PROCEDURE — 99214 OFFICE O/P EST MOD 30 MIN: CPT | Performed by: ALLERGY & IMMUNOLOGY

## 2020-12-02 PROCEDURE — 3077F SYST BP >= 140 MM HG: CPT | Performed by: ALLERGY & IMMUNOLOGY

## 2020-12-02 PROCEDURE — 95117 IMMUNOTHERAPY INJECTIONS: CPT | Performed by: ALLERGY & IMMUNOLOGY

## 2020-12-02 PROCEDURE — 3079F DIAST BP 80-89 MM HG: CPT | Performed by: ALLERGY & IMMUNOLOGY

## 2020-12-02 RX ORDER — GUAIFENESIN 600 MG
1200 TABLET, EXTENDED RELEASE 12 HR ORAL ONCE
COMMUNITY

## 2020-12-02 NOTE — PATIENT INSTRUCTIONS
Recs:  Continue with current immunotherapy including maintenance dose immunotherapy every 4 weeks to mold dust mites and cats.   Reviewed with patient that by March 2021 he would have been on immunotherapy for approximately 5 years at maintenance dosing and

## 2020-12-02 NOTE — PROGRESS NOTES
Linda Ludwig is a 62year old male. HPI:   Patient presents with: Allergies: Routine follow up. Patient reports that he has had increased post nasal drip and recently started taking Mucinex.     Patient is a 43-year-old male who presents for follow-up tobacco: Never Used    Alcohol use:  Yes      Alcohol/week: 0.0 standard drinks      Comment: social     Drug use: No       Medications (Active prior to today's visit):  Current Outpatient Medications   Medication Sig Dispense Refill   • guaiFENesin  20 tablet 0       Allergies:    Ciprofloxacin           HIVES      ROS:   Allergic/Immuno:  See hpi  Cardiovascular:  Negative for irregular heartbeat/palpitations, chest pain, edema  Constitutional:  Negative night sweats,weight loss, irritability and let with medications including Flonase Allegra.   Continue with sinus rinses as needed  Mucinex as needed  Humidifier for the winter months      Follow-up in 6 to 12 months or sooner if needed    Over 25 minutes spent with patient in direct face-to-face contact

## 2020-12-30 ENCOUNTER — NURSE ONLY (OUTPATIENT)
Dept: ALLERGY | Facility: CLINIC | Age: 60
End: 2020-12-30
Payer: COMMERCIAL

## 2020-12-30 DIAGNOSIS — J30.89 ENVIRONMENTAL AND SEASONAL ALLERGIES: ICD-10-CM

## 2020-12-30 PROCEDURE — 95117 IMMUNOTHERAPY INJECTIONS: CPT | Performed by: ALLERGY & IMMUNOLOGY

## 2020-12-30 PROCEDURE — 95165 ANTIGEN THERAPY SERVICES: CPT | Performed by: ALLERGY & IMMUNOLOGY

## 2021-01-07 ENCOUNTER — OFFICE VISIT (OUTPATIENT)
Dept: FAMILY MEDICINE CLINIC | Facility: CLINIC | Age: 61
End: 2021-01-07
Payer: COMMERCIAL

## 2021-01-07 VITALS
BODY MASS INDEX: 21.82 KG/M2 | DIASTOLIC BLOOD PRESSURE: 98 MMHG | HEIGHT: 68 IN | TEMPERATURE: 98 F | SYSTOLIC BLOOD PRESSURE: 162 MMHG | HEART RATE: 67 BPM | WEIGHT: 144 LBS

## 2021-01-07 DIAGNOSIS — R07.89 LEFT-SIDED CHEST WALL PAIN: Primary | ICD-10-CM

## 2021-01-07 PROCEDURE — 3080F DIAST BP >= 90 MM HG: CPT | Performed by: STUDENT IN AN ORGANIZED HEALTH CARE EDUCATION/TRAINING PROGRAM

## 2021-01-07 PROCEDURE — 99213 OFFICE O/P EST LOW 20 MIN: CPT | Performed by: STUDENT IN AN ORGANIZED HEALTH CARE EDUCATION/TRAINING PROGRAM

## 2021-01-07 PROCEDURE — 3008F BODY MASS INDEX DOCD: CPT | Performed by: STUDENT IN AN ORGANIZED HEALTH CARE EDUCATION/TRAINING PROGRAM

## 2021-01-07 PROCEDURE — 3077F SYST BP >= 140 MM HG: CPT | Performed by: STUDENT IN AN ORGANIZED HEALTH CARE EDUCATION/TRAINING PROGRAM

## 2021-01-08 ENCOUNTER — EKG ENCOUNTER (OUTPATIENT)
Dept: LAB | Age: 61
End: 2021-01-08
Attending: STUDENT IN AN ORGANIZED HEALTH CARE EDUCATION/TRAINING PROGRAM
Payer: COMMERCIAL

## 2021-01-08 DIAGNOSIS — R07.89 OTHER CHEST PAIN: Primary | ICD-10-CM

## 2021-01-08 PROCEDURE — 93010 ELECTROCARDIOGRAM REPORT: CPT | Performed by: STUDENT IN AN ORGANIZED HEALTH CARE EDUCATION/TRAINING PROGRAM

## 2021-01-08 PROCEDURE — 93005 ELECTROCARDIOGRAM TRACING: CPT

## 2021-01-08 NOTE — PROGRESS NOTES
HPI:    Patient ID: Ruddy Nina is a 62year old male. HPI  Pt presenting with Left clavicle pain.  He reports intermittent clavicle pain since last week, described as a fluttering overlying collarbone and Left chest. Pain sometimes triggered by [employer] taking: Reported on 12/2/2020 ) 30 capsule 0   • Amoxicillin-Pot Clavulanate (AUGMENTIN) 875-125 MG Oral Tab Take 1 tablet by mouth 2 (two) times daily.  (Patient not taking: Reported on 12/2/2020 ) 20 tablet 0     Allergies:  Ciprofloxacin           HIVES normal. Behavior is cooperative. Cognition and Memory: Cognition normal.             ASSESSMENT/PLAN:   1.  Left-sided chest wall pain  - will check EKG  - labs from 9/2020 reviewed  - discussed red flags for urgent reevaluation  - continue to monit

## 2021-01-27 ENCOUNTER — NURSE ONLY (OUTPATIENT)
Dept: ALLERGY | Facility: CLINIC | Age: 61
End: 2021-01-27
Payer: COMMERCIAL

## 2021-01-27 ENCOUNTER — TELEPHONE (OUTPATIENT)
Dept: ALLERGY | Facility: CLINIC | Age: 61
End: 2021-01-27

## 2021-01-27 DIAGNOSIS — J30.89 ENVIRONMENTAL AND SEASONAL ALLERGIES: ICD-10-CM

## 2021-01-27 PROCEDURE — 95117 IMMUNOTHERAPY INJECTIONS: CPT | Performed by: ALLERGY & IMMUNOLOGY

## 2021-01-27 NOTE — TELEPHONE ENCOUNTER
Patient stated he was scheduled for an allergy shot on 2/17 and received one today. Patient would like to confirm if that date is correct or if he needs to reschedule on 2/24. Patient stated he usually schedules for once a month. Please advise.

## 2021-02-12 RX ORDER — FLUTICASONE PROPIONATE 50 MCG
SPRAY, SUSPENSION (ML) NASAL
Qty: 3 BOTTLE | Refills: 1 | Status: SHIPPED | OUTPATIENT
Start: 2021-02-12

## 2021-02-12 NOTE — TELEPHONE ENCOUNTER
Refill requested for:  Name from pharmacy: FLUTICASONE 50MCG NASAL SP (120) RX          Will file in chart as: FLUTICASONE PROPIONATE 50 MCG/ACT Nasal Suspension    Sig: SHAKE LIQUID AND USE 2 SPRAYS IN EACH NOSTRIL DAILY    Disp:  16 g    Refills:  0 (Pha

## 2021-02-24 ENCOUNTER — NURSE ONLY (OUTPATIENT)
Dept: ALLERGY | Facility: CLINIC | Age: 61
End: 2021-02-24
Payer: COMMERCIAL

## 2021-02-24 DIAGNOSIS — J30.89 ENVIRONMENTAL AND SEASONAL ALLERGIES: ICD-10-CM

## 2021-02-24 PROCEDURE — 95117 IMMUNOTHERAPY INJECTIONS: CPT | Performed by: ALLERGY & IMMUNOLOGY

## 2021-03-03 ENCOUNTER — NURSE TRIAGE (OUTPATIENT)
Dept: FAMILY MEDICINE CLINIC | Facility: CLINIC | Age: 61
End: 2021-03-03

## 2021-03-03 NOTE — TELEPHONE ENCOUNTER
Action Requested: Summary for Provider     []  Critical Lab, Recommendations Needed  [] Need Additional Advice  []   FYI    []   Need Orders  [] Need Medications Sent to Pharmacy  []  Other     SUMMARY: Patient scheduled for video visit tomorrow 3/4 with RACHEL

## 2021-03-04 ENCOUNTER — TELEMEDICINE (OUTPATIENT)
Dept: FAMILY MEDICINE CLINIC | Facility: CLINIC | Age: 61
End: 2021-03-04

## 2021-03-04 ENCOUNTER — LAB ENCOUNTER (OUTPATIENT)
Dept: LAB | Facility: HOSPITAL | Age: 61
End: 2021-03-04
Attending: FAMILY MEDICINE
Payer: COMMERCIAL

## 2021-03-04 ENCOUNTER — TELEPHONE (OUTPATIENT)
Dept: FAMILY MEDICINE CLINIC | Facility: CLINIC | Age: 61
End: 2021-03-04

## 2021-03-04 DIAGNOSIS — R09.81 SINUS CONGESTION: Primary | ICD-10-CM

## 2021-03-04 DIAGNOSIS — R09.81 SINUS CONGESTION: ICD-10-CM

## 2021-03-04 PROCEDURE — 99213 OFFICE O/P EST LOW 20 MIN: CPT | Performed by: FAMILY MEDICINE

## 2021-03-04 RX ORDER — AZITHROMYCIN 250 MG/1
TABLET, FILM COATED ORAL
Qty: 6 TABLET | Refills: 0 | Status: SHIPPED | OUTPATIENT
Start: 2021-03-04 | End: 2021-03-09

## 2021-03-04 NOTE — TELEPHONE ENCOUNTER
Can you please order his covid test         ASSESSMENT/PLAN:   Sinus congestion with some sore throat for about 4 days:  - After discussion with patient, will check for COVID 19 as discussed; Consider zpak as directed if worse or not better;  To call if any

## 2021-03-04 NOTE — TELEPHONE ENCOUNTER
Pt was seen in the office today. Per Darius Camp, the patient has phoned them twice trying to schedule a covid test. There are no orders in the system. Please, add them.

## 2021-03-04 NOTE — PROGRESS NOTES
HPI:    Patient ID: Cj Brown is a 62year old male. This visit is conducted using Telemedicine with live, interactive video and audio during this Coronavirus pandemic.     Please note that the following visit was completed using two-way, real-time for diarrhea. Current Outpatient Medications   Medication Sig Dispense Refill   • azithromycin (ZITHROMAX Z-NICHO) 250 MG Oral Tab Take 2 tablets (500 mg total) by mouth daily for 1 day, THEN 1 tablet (250 mg total) daily for 4 days.  6 tablet 0 sentences without shortness of breath     Psychiatric: He has a normal mood and affect.  His behavior is normal.              ASSESSMENT/PLAN:   Sinus congestion with some sore throat for about 4 days:  - After discussion with patient, will check for COVID

## 2021-03-05 ENCOUNTER — TELEPHONE (OUTPATIENT)
Dept: FAMILY MEDICINE CLINIC | Facility: CLINIC | Age: 61
End: 2021-03-05

## 2021-03-05 LAB — SARS-COV-2 RNA RESP QL NAA+PROBE: NOT DETECTED

## 2021-03-05 RX ORDER — DICYCLOMINE HCL 20 MG
TABLET ORAL
Qty: 90 TABLET | Refills: 1 | Status: SHIPPED | OUTPATIENT
Start: 2021-03-05 | End: 2021-08-31

## 2021-03-05 NOTE — TELEPHONE ENCOUNTER
Patient had telemedicine visit yesterday with PCP  Was prescribed z-izabela    Patient forgot to ask provider if it is OK to take his Z-izabela along with Mucinex, which he states has also helped his symptoms of congestion in the past    Routing to Dr. Roosevelt [address]

## 2021-03-24 ENCOUNTER — NURSE ONLY (OUTPATIENT)
Dept: ALLERGY | Facility: CLINIC | Age: 61
End: 2021-03-24
Payer: COMMERCIAL

## 2021-03-24 DIAGNOSIS — J30.89 ENVIRONMENTAL AND SEASONAL ALLERGIES: ICD-10-CM

## 2021-03-24 PROCEDURE — 95117 IMMUNOTHERAPY INJECTIONS: CPT | Performed by: ALLERGY & IMMUNOLOGY

## 2021-04-05 ENCOUNTER — TELEPHONE (OUTPATIENT)
Dept: FAMILY MEDICINE CLINIC | Facility: CLINIC | Age: 61
End: 2021-04-05

## 2021-04-05 DIAGNOSIS — E11.9 CONTROLLED TYPE 2 DIABETES MELLITUS WITHOUT COMPLICATION, UNSPECIFIED WHETHER LONG TERM INSULIN USE (HCC): Primary | ICD-10-CM

## 2021-04-06 NOTE — TELEPHONE ENCOUNTER
Encounter Messages    Read Composed From To  Subject   N 4/6/2021 10:29 AM Isabel Kim MD  RE:orders   Y 4/6/2021 10:28 AM MD Arina Kumari  orders   orders  Message [id number]  From   Mukesh Hradwick MD To   Aurea Garza

## 2021-04-10 ENCOUNTER — LAB ENCOUNTER (OUTPATIENT)
Dept: LAB | Facility: HOSPITAL | Age: 61
End: 2021-04-10
Attending: FAMILY MEDICINE
Payer: COMMERCIAL

## 2021-04-10 DIAGNOSIS — E11.9 CONTROLLED TYPE 2 DIABETES MELLITUS WITHOUT COMPLICATION, UNSPECIFIED WHETHER LONG TERM INSULIN USE (HCC): ICD-10-CM

## 2021-04-10 PROCEDURE — 83036 HEMOGLOBIN GLYCOSYLATED A1C: CPT

## 2021-04-10 PROCEDURE — 80061 LIPID PANEL: CPT

## 2021-04-10 PROCEDURE — 36415 COLL VENOUS BLD VENIPUNCTURE: CPT

## 2021-04-10 PROCEDURE — 82043 UR ALBUMIN QUANTITATIVE: CPT

## 2021-04-10 PROCEDURE — 80053 COMPREHEN METABOLIC PANEL: CPT

## 2021-04-10 PROCEDURE — 82570 ASSAY OF URINE CREATININE: CPT

## 2021-04-10 PROCEDURE — 85027 COMPLETE CBC AUTOMATED: CPT

## 2021-04-12 ENCOUNTER — TELEPHONE (OUTPATIENT)
Dept: FAMILY MEDICINE CLINIC | Facility: CLINIC | Age: 61
End: 2021-04-12

## 2021-04-12 NOTE — TELEPHONE ENCOUNTER
Patient returning call for lab results; relayed PCP message below. Patient would also like to know if he should get the covid-19 vaccination. Please advise.      Amadou Cazares MD   4/12/2021 10:54 AM CDT Back to Top      Labs stable and unremarkable:

## 2021-04-21 ENCOUNTER — NURSE ONLY (OUTPATIENT)
Dept: ALLERGY | Facility: CLINIC | Age: 61
End: 2021-04-21
Payer: COMMERCIAL

## 2021-04-21 DIAGNOSIS — J30.89 ENVIRONMENTAL AND SEASONAL ALLERGIES: ICD-10-CM

## 2021-04-21 PROCEDURE — 95117 IMMUNOTHERAPY INJECTIONS: CPT | Performed by: ALLERGY & IMMUNOLOGY

## 2021-04-25 ENCOUNTER — IMMUNIZATION (OUTPATIENT)
Dept: LAB | Facility: HOSPITAL | Age: 61
End: 2021-04-25
Attending: EMERGENCY MEDICINE
Payer: COMMERCIAL

## 2021-04-25 DIAGNOSIS — Z23 NEED FOR VACCINATION: Primary | ICD-10-CM

## 2021-04-25 PROCEDURE — 0011A SARSCOV2 VAC 100MCG/0.5ML IM: CPT

## 2021-05-19 ENCOUNTER — NURSE ONLY (OUTPATIENT)
Dept: ALLERGY | Facility: CLINIC | Age: 61
End: 2021-05-19
Payer: COMMERCIAL

## 2021-05-19 DIAGNOSIS — J30.89 ENVIRONMENTAL AND SEASONAL ALLERGIES: ICD-10-CM

## 2021-05-19 PROCEDURE — 95117 IMMUNOTHERAPY INJECTIONS: CPT | Performed by: ALLERGY & IMMUNOLOGY

## 2021-05-21 ENCOUNTER — HOSPITAL ENCOUNTER (EMERGENCY)
Facility: HOSPITAL | Age: 61
Discharge: HOME | End: 2021-05-21
Attending: EMERGENCY MEDICINE
Payer: COMMERCIAL

## 2021-05-21 ENCOUNTER — PATIENT OUTREACH (OUTPATIENT)
Dept: CASE MANAGEMENT | Age: 61
End: 2021-05-21

## 2021-05-21 ENCOUNTER — APPOINTMENT (OUTPATIENT)
Dept: GENERAL RADIOLOGY | Facility: HOSPITAL | Age: 61
End: 2021-05-21
Payer: COMMERCIAL

## 2021-05-21 VITALS
BODY MASS INDEX: 22 KG/M2 | HEART RATE: 84 BPM | TEMPERATURE: 98 F | RESPIRATION RATE: 18 BRPM | WEIGHT: 144 LBS | SYSTOLIC BLOOD PRESSURE: 135 MMHG | OXYGEN SATURATION: 95 % | DIASTOLIC BLOOD PRESSURE: 83 MMHG

## 2021-05-21 DIAGNOSIS — S92.425A CLOSED NONDISPLACED FRACTURE OF DISTAL PHALANX OF LEFT GREAT TOE, INITIAL ENCOUNTER: Primary | ICD-10-CM

## 2021-05-21 PROCEDURE — 28490 TREAT BIG TOE FRACTURE: CPT

## 2021-05-21 PROCEDURE — 99283 EMERGENCY DEPT VISIT LOW MDM: CPT

## 2021-05-21 PROCEDURE — 73630 X-RAY EXAM OF FOOT: CPT | Performed by: EMERGENCY MEDICINE

## 2021-05-21 RX ORDER — HYDROCODONE BITARTRATE AND ACETAMINOPHEN 5; 325 MG/1; MG/1
1 TABLET ORAL EVERY 6 HOURS PRN
Qty: 10 TABLET | Refills: 0 | Status: SHIPPED | OUTPATIENT
Start: 2021-05-21 | End: 2021-05-28

## 2021-05-21 NOTE — ED INITIAL ASSESSMENT (HPI)
Left toe/foot injury after table fell onto foot at work  Large amount of bruising and swelling noted. No lacerations noted.     Works for [employer] in [employer]

## 2021-05-21 NOTE — ED PROVIDER NOTES
Patient Seen in: [facility] AND [facility] Emergency Department      History   Patient presents with:  Leg or Foot Injury    Stated Complaint: left toe injury    HPI/Subjective:   HPI    23-year-old male with history of diabetes, here with complaints of left gre complaint: left toe injury  Other systems are as noted in HPI. Constitutional and vital signs reviewed. All other systems reviewed and negative except as noted above.     Physical Exam     ED Triage Vitals [05/21/21 1318]   /83   Pulse 84   Resp 2:08 PM     Finalized by (CST): Katheryn Garza MD on 5/21/2021 at 2:13 PM              EMERGENCY DEPARTMENT COURSE AND TREATMENT:  Patient's condition was stable during Emergency Department evaluation.      57yoM with L toe injury  - I personally reviewed an followup          Medications Prescribed:  Current Discharge Medication List    START taking these medications    HYDROcodone-acetaminophen 5-325 MG Oral Tab  Take 1 tablet by mouth every 6 (six) hours as needed.   Qty: 10 tablet Refills: 0

## 2021-05-21 NOTE — PROGRESS NOTES
Patient called requesting assistance to sched ER fup: after speaking with the patient this is going to be work comp and pt does not have all the information/authorization from [facility] to make appt yet.              Rosario Cook MD as soon as possible for a v

## 2021-05-23 ENCOUNTER — IMMUNIZATION (OUTPATIENT)
Dept: LAB | Facility: HOSPITAL | Age: 61
End: 2021-05-23
Attending: EMERGENCY MEDICINE
Payer: COMMERCIAL

## 2021-05-23 DIAGNOSIS — Z23 NEED FOR VACCINATION: Primary | ICD-10-CM

## 2021-05-23 PROCEDURE — 0012A SARSCOV2 VAC 100MCG/0.5ML IM: CPT

## 2021-05-28 RX ORDER — GLUCOSAM/CHON-MSM1/C/MANG/BOSW 500-416.6
TABLET ORAL
Qty: 100 EACH | Refills: 3 | Status: CANCELLED | OUTPATIENT
Start: 2021-05-28

## 2021-05-28 NOTE — TELEPHONE ENCOUNTER
Patient is calling to follow up in the request for the Lancets. Patient is down to about 10 left. He is trying to stay ahead of the game and have the request & refill ordered now before he gets too low.  He advised he checks his blood sugar once a day in th

## 2021-05-29 RX ORDER — GLUCOSAM/CHON-MSM1/C/MANG/BOSW 500-416.6
TABLET ORAL
Qty: 100 EACH | Refills: 3 | Status: SHIPPED | OUTPATIENT
Start: 2021-05-29

## 2021-06-10 ENCOUNTER — MED REC SCAN ONLY (OUTPATIENT)
Dept: FAMILY MEDICINE CLINIC | Facility: CLINIC | Age: 61
End: 2021-06-10

## 2021-06-12 RX ORDER — GLYBURIDE-METFORMIN HYDROCHLORIDE 2.5; 5 MG/1; MG/1
TABLET ORAL
Qty: 90 TABLET | Refills: 1 | Status: SHIPPED | OUTPATIENT
Start: 2021-06-12 | End: 2021-09-16

## 2021-06-15 ENCOUNTER — OFFICE VISIT (OUTPATIENT)
Dept: FAMILY MEDICINE CLINIC | Facility: CLINIC | Age: 61
End: 2021-06-15
Payer: COMMERCIAL

## 2021-06-15 VITALS
DIASTOLIC BLOOD PRESSURE: 80 MMHG | SYSTOLIC BLOOD PRESSURE: 130 MMHG | HEIGHT: 68 IN | TEMPERATURE: 98 F | WEIGHT: 147 LBS | BODY MASS INDEX: 22.28 KG/M2 | HEART RATE: 69 BPM | RESPIRATION RATE: 18 BRPM

## 2021-06-15 DIAGNOSIS — Z00.00 ROUTINE PHYSICAL EXAMINATION: Primary | ICD-10-CM

## 2021-06-15 DIAGNOSIS — Z86.39 HISTORY OF DIABETES MELLITUS: ICD-10-CM

## 2021-06-15 PROCEDURE — 3008F BODY MASS INDEX DOCD: CPT | Performed by: FAMILY MEDICINE

## 2021-06-15 PROCEDURE — 99396 PREV VISIT EST AGE 40-64: CPT | Performed by: FAMILY MEDICINE

## 2021-06-15 PROCEDURE — 3075F SYST BP GE 130 - 139MM HG: CPT | Performed by: FAMILY MEDICINE

## 2021-06-15 PROCEDURE — 3079F DIAST BP 80-89 MM HG: CPT | Performed by: FAMILY MEDICINE

## 2021-06-15 NOTE — PROGRESS NOTES
HPI/Subjective:   Patient ID: Marky Bernal is a 62year old male. Patient is here for routine physical exam and follow up on chronic medical issues- hx of diabetes. Diet and exercise have been fair. Pt had blood work prior to the appointment.  Lab w Tablet 12 Hr Take 1,200 mg by mouth one time. • TRUE METRIX BLOOD GLUCOSE TEST In Vitro Strip USE ONE STRIP DAILY FOR BLOOD GLUCOSE MONITORING 100 strip 3   • Fexofenadine HCl 180 MG Oral Tab Take 180 mg by mouth daily.      • TRUE METRIX BLOOD GLUCOSE Reflexes: Reflexes are normal and symmetric. Psychiatric:         Mood and Affect: Mood normal.         Behavior: Behavior normal.         Assessment & Plan:   Routine physical examination: had COVID vaccine  - Exam is unremarkable.  Screening tests were

## 2021-06-16 ENCOUNTER — NURSE ONLY (OUTPATIENT)
Dept: ALLERGY | Facility: CLINIC | Age: 61
End: 2021-06-16
Payer: COMMERCIAL

## 2021-06-16 ENCOUNTER — TELEPHONE (OUTPATIENT)
Dept: ALLERGY | Facility: CLINIC | Age: 61
End: 2021-06-16

## 2021-06-16 DIAGNOSIS — J30.89 ENVIRONMENTAL AND SEASONAL ALLERGIES: ICD-10-CM

## 2021-06-16 PROCEDURE — 95117 IMMUNOTHERAPY INJECTIONS: CPT | Performed by: ALLERGY & IMMUNOLOGY

## 2021-06-16 NOTE — TELEPHONE ENCOUNTER
Drug change request received from the pharmacy for Fluticasone Nasal Spray. We last filled on 02/12/2021. Unsure why we are receiving this request in June. Called patient to find out if he has been using fluticasone, has he been buying OTC?     Per

## 2021-06-24 ENCOUNTER — TELEPHONE (OUTPATIENT)
Dept: FAMILY MEDICINE CLINIC | Facility: CLINIC | Age: 61
End: 2021-06-24

## 2021-06-24 NOTE — TELEPHONE ENCOUNTER
Patient DM eye exam report recd from [facility].    DM flowsheet upated and report sent for scanning

## 2021-07-06 NOTE — TELEPHONE ENCOUNTER
Chronic nasal congestion / Possible GUS  New problem  DDx: +PND, DNS, onstruction, GUS, other  Start Flonase nasal spray BID  Avoid triggers  Sleep Medicine referral (Dr. Edmonds)   Gaviota Briones requesting for Dr Courtney Barraza to review US done on 01/19/17. Please advise. Thank you.

## 2021-07-12 RX ORDER — CALCIUM CITRATE/VITAMIN D3 200MG-6.25
TABLET ORAL
Qty: 100 STRIP | Refills: 3 | Status: SHIPPED | OUTPATIENT
Start: 2021-07-12

## 2021-07-14 ENCOUNTER — NURSE ONLY (OUTPATIENT)
Dept: ALLERGY | Facility: CLINIC | Age: 61
End: 2021-07-14
Payer: COMMERCIAL

## 2021-07-14 DIAGNOSIS — J30.89 ENVIRONMENTAL AND SEASONAL ALLERGIES: ICD-10-CM

## 2021-07-14 PROCEDURE — 95117 IMMUNOTHERAPY INJECTIONS: CPT | Performed by: ALLERGY & IMMUNOLOGY

## 2021-07-21 ENCOUNTER — HOSPITAL ENCOUNTER (EMERGENCY)
Facility: HOSPITAL | Age: 61
Discharge: HOME | End: 2021-07-21
Attending: EMERGENCY MEDICINE
Payer: COMMERCIAL

## 2021-07-21 VITALS
WEIGHT: 144 LBS | TEMPERATURE: 99 F | BODY MASS INDEX: 21.82 KG/M2 | HEIGHT: 68 IN | DIASTOLIC BLOOD PRESSURE: 92 MMHG | RESPIRATION RATE: 20 BRPM | SYSTOLIC BLOOD PRESSURE: 143 MMHG | OXYGEN SATURATION: 98 % | HEART RATE: 75 BPM

## 2021-07-21 DIAGNOSIS — R73.9 HYPERGLYCEMIA: Primary | ICD-10-CM

## 2021-07-21 LAB
ANION GAP SERPL CALC-SCNC: 7 MMOL/L (ref 0–18)
BASOPHILS # BLD AUTO: 0.02 X10(3) UL (ref 0–0.2)
BASOPHILS NFR BLD AUTO: 0.4 %
BUN BLD-MCNC: 17 MG/DL (ref 7–18)
BUN/CREAT SERPL: 21.3 (ref 10–20)
CALCIUM BLD-MCNC: 8.5 MG/DL (ref 8.5–10.1)
CHLORIDE SERPL-SCNC: 105 MMOL/L (ref 98–112)
CO2 SERPL-SCNC: 29 MMOL/L (ref 21–32)
CREAT BLD-MCNC: 0.8 MG/DL
DEPRECATED RDW RBC AUTO: 40.8 FL (ref 35.1–46.3)
EOSINOPHIL # BLD AUTO: 0.06 X10(3) UL (ref 0–0.7)
EOSINOPHIL NFR BLD AUTO: 1.2 %
ERYTHROCYTE [DISTWIDTH] IN BLOOD BY AUTOMATED COUNT: 11.8 % (ref 11–15)
GLUCOSE BLD-MCNC: 261 MG/DL (ref 70–99)
GLUCOSE BLDC GLUCOMTR-MCNC: 176 MG/DL (ref 70–99)
GLUCOSE BLDC GLUCOMTR-MCNC: 273 MG/DL (ref 70–99)
HCT VFR BLD AUTO: 41.9 %
HGB BLD-MCNC: 14.3 G/DL
IMM GRANULOCYTES # BLD AUTO: 0.02 X10(3) UL (ref 0–1)
IMM GRANULOCYTES NFR BLD: 0.4 %
LYMPHOCYTES # BLD AUTO: 0.88 X10(3) UL (ref 1–4)
LYMPHOCYTES NFR BLD AUTO: 17.2 %
MCH RBC QN AUTO: 31.6 PG (ref 26–34)
MCHC RBC AUTO-ENTMCNC: 34.1 G/DL (ref 31–37)
MCV RBC AUTO: 92.5 FL
MONOCYTES # BLD AUTO: 0.31 X10(3) UL (ref 0.1–1)
MONOCYTES NFR BLD AUTO: 6.1 %
NEUTROPHILS # BLD AUTO: 3.83 X10 (3) UL (ref 1.5–7.7)
NEUTROPHILS # BLD AUTO: 3.83 X10(3) UL (ref 1.5–7.7)
NEUTROPHILS NFR BLD AUTO: 74.7 %
OSMOLALITY SERPL CALC.SUM OF ELEC: 303 MOSM/KG (ref 275–295)
PLATELET # BLD AUTO: 169 10(3)UL (ref 150–450)
POTASSIUM SERPL-SCNC: 3.6 MMOL/L (ref 3.5–5.1)
RBC # BLD AUTO: 4.53 X10(6)UL
SODIUM SERPL-SCNC: 141 MMOL/L (ref 136–145)
WBC # BLD AUTO: 5.1 X10(3) UL (ref 4–11)

## 2021-07-21 PROCEDURE — 99284 EMERGENCY DEPT VISIT MOD MDM: CPT

## 2021-07-21 PROCEDURE — 80048 BASIC METABOLIC PNL TOTAL CA: CPT | Performed by: EMERGENCY MEDICINE

## 2021-07-21 PROCEDURE — 96361 HYDRATE IV INFUSION ADD-ON: CPT

## 2021-07-21 PROCEDURE — 96360 HYDRATION IV INFUSION INIT: CPT

## 2021-07-21 PROCEDURE — 85025 COMPLETE CBC W/AUTO DIFF WBC: CPT | Performed by: EMERGENCY MEDICINE

## 2021-07-21 PROCEDURE — 82962 GLUCOSE BLOOD TEST: CPT

## 2021-07-21 NOTE — ED PROVIDER NOTES
Patient Seen in: [facility] AND [facility] Emergency Department      History   Patient presents with:  Hyperglycemia    Stated Complaint: Hyperglycemia    HPI/Subjective:   HPI  41-year-old male with history of type 2 diabetes, on glyburide, here with complaints Hyperglycemia  Other systems are as noted in HPI. Constitutional and vital signs reviewed. All other systems reviewed and negative except as noted above.     Physical Exam     ED Triage Vitals [07/21/21 0605]   BP (!) 169/88   Pulse 88   Resp 20   Tem Calculated Osmolality 303 (H) 275 - 295 mOsm/kg    GFR, Non- 98 >=60    GFR, -American 113 >=60   CBC W/ DIFFERENTIAL    Collection Time: 07/21/21  6:28 AM   Result Value Ref Range    WBC 5.1 4.0 - 11.0 x10(3) uL    RBC 4.53 4.30 - 5 recent pertinent discharge summaries, testing, and procedures, and reviewed those reports. Complicating Factors: The patient already has does not have any pertinent problems on file. to contribute to the complexity of his ED evaluation.     - pt  comfort

## 2021-08-11 ENCOUNTER — NURSE ONLY (OUTPATIENT)
Dept: ALLERGY | Facility: CLINIC | Age: 61
End: 2021-08-11
Payer: COMMERCIAL

## 2021-08-11 DIAGNOSIS — J30.89 ENVIRONMENTAL AND SEASONAL ALLERGIES: ICD-10-CM

## 2021-08-11 PROCEDURE — 95117 IMMUNOTHERAPY INJECTIONS: CPT | Performed by: ALLERGY & IMMUNOLOGY

## 2021-08-31 RX ORDER — DICYCLOMINE HCL 20 MG
20 TABLET ORAL 3 TIMES DAILY
Qty: 90 TABLET | Refills: 1 | Status: SHIPPED | OUTPATIENT
Start: 2021-08-31 | End: 2022-02-26

## 2021-08-31 NOTE — TELEPHONE ENCOUNTER
Refill passed per [place] [facility] protocol.     Requested Prescriptions   Pending Prescriptions Disp Refills    DICYCLOMINE 20 MG Oral Tab [Pharmacy Med Name: DICYCLOMINE 20MG TABLETS] 90 tablet 1     Sig: TAKE 1 TABLET(20 MG) BY MOUTH THREE TIMES DAILY        Gastrointestional Medication Protocol Passed - 8/31/2021  8:07 AM        Passed - Appointment in past 12 or next 3 months            Future Appointments         Provider Department Appt Notes    In 1 week [address] [facility], [address]           Recent Outpatient Visits              2 weeks ago [facility] and seasonal [facility] [address]    Nurse Only    1 month ago Environmental and seasonal allergies    [facility], [address]    Nurse Only    2 months ago Environmental and seasonal allergies    [facility], [address]    Nurse Only    2 months ago Routine physical examination    Kevin Coronel MD    Office Visit    3 months ago Environmental and seasonal allergies    [facility], [address]    Nurse Only

## 2021-09-04 ENCOUNTER — HOSPITAL ENCOUNTER (OUTPATIENT)
Age: 61
Discharge: HOME | End: 2021-09-04
Attending: PHYSICIAN ASSISTANT
Payer: COMMERCIAL

## 2021-09-04 VITALS
HEART RATE: 74 BPM | OXYGEN SATURATION: 100 % | SYSTOLIC BLOOD PRESSURE: 153 MMHG | RESPIRATION RATE: 18 BRPM | DIASTOLIC BLOOD PRESSURE: 79 MMHG | TEMPERATURE: 98 F

## 2021-09-04 DIAGNOSIS — J01.90 ACUTE NON-RECURRENT SINUSITIS, UNSPECIFIED LOCATION: Primary | ICD-10-CM

## 2021-09-04 DIAGNOSIS — R03.0 ELEVATED BLOOD PRESSURE READING: ICD-10-CM

## 2021-09-04 PROCEDURE — 99213 OFFICE O/P EST LOW 20 MIN: CPT

## 2021-09-04 RX ORDER — AMOXICILLIN AND CLAVULANATE POTASSIUM 875; 125 MG/1; MG/1
1 TABLET, FILM COATED ORAL 2 TIMES DAILY
Qty: 14 TABLET | Refills: 0 | Status: SHIPPED | OUTPATIENT
Start: 2021-09-04 | End: 2021-09-11

## 2021-09-04 NOTE — ED PROVIDER NOTES
Patient Seen in: [facility]    History   Patient presents with:  Sinus Problem    Stated Complaint: post nasal drip    HPI    18-year-old male presents with chief complaint of bilateral frontal sinus pain. Onset 3 days ago.   Patient reports OR),  Take by mouth. Probiotic Product (PROBIOTIC OR),  Take  by mouth daily. Vitamins-Lipotropics (MULTI-VITAMIN HP/MINERALS) Oral Cap,  Take  by mouth.    TRUE METRIX BLOOD GLUCOSE TEST In Vitro Strip,  USE AS DIRECTED TO TEST BLOOD GLUCOSE ONCE DAILY Alert, No abnormalities of mood, affect. Head: Normocephalic/atraumatic. Nontender. Eyes: Pupils are equal round reactive to light. Conjunctiva are within normal limits. ENT: Pharynx noninjected. Tonsils within normal size limits bilaterally.   No ton patient instructions regarding their diagnoses, expectations, follow up, and ER precautions. I explained to the patient that emergent conditions may arise and to go to the ER for new, worsening or any persistent conditions.  I've explained the importance of

## 2021-09-08 ENCOUNTER — NURSE ONLY (OUTPATIENT)
Dept: ALLERGY | Facility: CLINIC | Age: 61
End: 2021-09-08
Payer: COMMERCIAL

## 2021-09-08 DIAGNOSIS — J30.89 ENVIRONMENTAL AND SEASONAL ALLERGIES: ICD-10-CM

## 2021-09-08 PROCEDURE — 95165 ANTIGEN THERAPY SERVICES: CPT | Performed by: ALLERGY & IMMUNOLOGY

## 2021-09-08 PROCEDURE — 95117 IMMUNOTHERAPY INJECTIONS: CPT | Performed by: ALLERGY & IMMUNOLOGY

## 2021-09-16 ENCOUNTER — TELEPHONE (OUTPATIENT)
Dept: FAMILY MEDICINE CLINIC | Facility: CLINIC | Age: 61
End: 2021-09-16

## 2021-09-16 ENCOUNTER — PATIENT MESSAGE (OUTPATIENT)
Dept: FAMILY MEDICINE CLINIC | Facility: CLINIC | Age: 61
End: 2021-09-16

## 2021-09-16 DIAGNOSIS — Z86.39 HISTORY OF DIABETES MELLITUS: Primary | ICD-10-CM

## 2021-09-16 RX ORDER — GLYBURIDE-METFORMIN HYDROCHLORIDE 2.5; 5 MG/1; MG/1
1 TABLET ORAL
Qty: 90 TABLET | Refills: 1 | Status: SHIPPED | OUTPATIENT
Start: 2021-09-16

## 2021-09-16 NOTE — TELEPHONE ENCOUNTER
Patient states he is due for lab work in 6 months for Hemoglobin A1C test, and to check on his diabetes. No future lab orders in system. Please advise.

## 2021-09-16 NOTE — TELEPHONE ENCOUNTER
Refill passed per [facility] protocol.   Requested Prescriptions   Pending Prescriptions Disp Refills    GLYBURIDE-METFORMIN 2.5-500 MG Oral Tab [Pharmacy Med Name: GLYBURIDE/METFORMIN 2.5/500MG TABS] 90 tablet 1     Sig: TAKE 1 TABLET BY MOUTH DAILY W

## 2021-10-05 ENCOUNTER — LAB ENCOUNTER (OUTPATIENT)
Dept: LAB | Age: 61
End: 2021-10-05
Attending: FAMILY MEDICINE
Payer: COMMERCIAL

## 2021-10-05 DIAGNOSIS — Z86.39 HISTORY OF DIABETES MELLITUS: ICD-10-CM

## 2021-10-05 PROCEDURE — 83036 HEMOGLOBIN GLYCOSYLATED A1C: CPT

## 2021-10-05 PROCEDURE — 36415 COLL VENOUS BLD VENIPUNCTURE: CPT

## 2021-10-06 ENCOUNTER — NURSE ONLY (OUTPATIENT)
Dept: ALLERGY | Facility: CLINIC | Age: 61
End: 2021-10-06
Payer: COMMERCIAL

## 2021-10-06 DIAGNOSIS — J30.89 ENVIRONMENTAL AND SEASONAL ALLERGIES: ICD-10-CM

## 2021-10-06 PROCEDURE — 95117 IMMUNOTHERAPY INJECTIONS: CPT | Performed by: ALLERGY & IMMUNOLOGY

## 2021-10-12 ENCOUNTER — TELEPHONE (OUTPATIENT)
Dept: FAMILY MEDICINE CLINIC | Facility: CLINIC | Age: 61
End: 2021-10-12

## 2021-10-12 NOTE — TELEPHONE ENCOUNTER
Pt states that he is 62years old and would like to know if the doctor recommends for him to get the shingles vaccine.

## 2021-10-12 NOTE — TELEPHONE ENCOUNTER
The patient was informed at the last office visit Dr. Rosita Manjarrez stated was considering other vaccine. I stated per CDC guidelines    Recombinant zoster vaccine (RZV, Shingrix) is recommended to prevent shingles in adults 50 and older.     The patient stated will

## 2021-11-03 ENCOUNTER — NURSE ONLY (OUTPATIENT)
Dept: ALLERGY | Facility: CLINIC | Age: 61
End: 2021-11-03
Payer: COMMERCIAL

## 2021-11-03 DIAGNOSIS — J30.89 ENVIRONMENTAL AND SEASONAL ALLERGIES: ICD-10-CM

## 2021-11-03 PROCEDURE — 95117 IMMUNOTHERAPY INJECTIONS: CPT | Performed by: ALLERGY & IMMUNOLOGY

## 2021-11-27 ENCOUNTER — HOSPITAL ENCOUNTER (OUTPATIENT)
Age: 61
Discharge: HOME | End: 2021-11-27
Payer: COMMERCIAL

## 2021-11-27 VITALS
SYSTOLIC BLOOD PRESSURE: 142 MMHG | RESPIRATION RATE: 20 BRPM | OXYGEN SATURATION: 99 % | DIASTOLIC BLOOD PRESSURE: 70 MMHG | HEART RATE: 80 BPM | TEMPERATURE: 98 F

## 2021-11-27 DIAGNOSIS — R09.82 ALLERGIC RHINITIS WITH POSTNASAL DRIP: Primary | ICD-10-CM

## 2021-11-27 DIAGNOSIS — J30.9 ALLERGIC RHINITIS WITH POSTNASAL DRIP: Primary | ICD-10-CM

## 2021-11-27 PROCEDURE — 99213 OFFICE O/P EST LOW 20 MIN: CPT

## 2021-11-27 PROCEDURE — 99212 OFFICE O/P EST SF 10 MIN: CPT

## 2021-11-27 RX ORDER — FLUTICASONE PROPIONATE 50 MCG
2 SPRAY, SUSPENSION (ML) NASAL DAILY
Qty: 16 G | Refills: 0 | Status: SHIPPED | OUTPATIENT
Start: 2021-11-27 | End: 2021-12-27

## 2021-11-27 NOTE — ED PROVIDER NOTES
Patient Seen in: [facility]      History   Patient presents with:  Sore Throat    Stated Complaint: sore throat,runny nose    Subjective:   HPI    This is a well-appearing 50-year-old male with a history of diabetes who presents with postnasa Exam  Vitals and nursing note reviewed. Constitutional:       General: He is awake. He is not in acute distress. Appearance: Normal appearance. He is normal weight. He is not ill-appearing, toxic-appearing or diaphoretic.    HENT:      Head: Normoceph Covid negative. Patient has difficulty following throat swab. He would like to decline at this time. MDM      Patient is well-appearing on exam, nontoxic appearance, exam as noted above.   I discussed with the patient that the Covid here was negati

## 2021-12-01 ENCOUNTER — APPOINTMENT (OUTPATIENT)
Dept: ALLERGY | Facility: CLINIC | Age: 61
End: 2021-12-01
Payer: COMMERCIAL

## 2021-12-01 ENCOUNTER — OFFICE VISIT (OUTPATIENT)
Dept: ALLERGY | Facility: CLINIC | Age: 61
End: 2021-12-01
Payer: COMMERCIAL

## 2021-12-01 VITALS
HEART RATE: 82 BPM | SYSTOLIC BLOOD PRESSURE: 131 MMHG | DIASTOLIC BLOOD PRESSURE: 75 MMHG | RESPIRATION RATE: 18 BRPM | OXYGEN SATURATION: 95 %

## 2021-12-01 DIAGNOSIS — Z23 FLU VACCINE NEED: ICD-10-CM

## 2021-12-01 DIAGNOSIS — Z92.29 COVID-19 VACCINE SERIES COMPLETED: ICD-10-CM

## 2021-12-01 DIAGNOSIS — J30.89 ENVIRONMENTAL AND SEASONAL ALLERGIES: ICD-10-CM

## 2021-12-01 DIAGNOSIS — J30.1 ALLERGIC RHINITIS DUE TO POLLEN, UNSPECIFIED SEASONALITY: Primary | ICD-10-CM

## 2021-12-01 DIAGNOSIS — Z91.048 ALLERGY TO MOLD: ICD-10-CM

## 2021-12-01 DIAGNOSIS — H69.83 DYSFUNCTION OF BOTH EUSTACHIAN TUBES: ICD-10-CM

## 2021-12-01 PROCEDURE — 3075F SYST BP GE 130 - 139MM HG: CPT | Performed by: ALLERGY & IMMUNOLOGY

## 2021-12-01 PROCEDURE — 95117 IMMUNOTHERAPY INJECTIONS: CPT | Performed by: ALLERGY & IMMUNOLOGY

## 2021-12-01 PROCEDURE — 3078F DIAST BP <80 MM HG: CPT | Performed by: ALLERGY & IMMUNOLOGY

## 2021-12-01 PROCEDURE — 99214 OFFICE O/P EST MOD 30 MIN: CPT | Performed by: ALLERGY & IMMUNOLOGY

## 2021-12-01 RX ORDER — AZELASTINE 1 MG/ML
2 SPRAY, METERED NASAL DAILY
Qty: 1 EACH | Refills: 0 | Status: SHIPPED | OUTPATIENT
Start: 2021-12-01

## 2021-12-01 NOTE — TELEPHONE ENCOUNTER
Pending Prescriptions Disp Refills    DICYCLOMINE HCL 20 MG Oral Tab [Pharmacy Med Name: DICYCLOMINE 20MG TABLETS] 90 tablet 0     Sig: TAKE 1 TABLET(20 MG) BY MOUTH THREE TIMES DAILY         See refill request  Patient last seen 7-19-16.    Medication la
Yes...

## 2021-12-01 NOTE — PROGRESS NOTES
Moises Roberson is a 62year old male. HPI:   Patient presents with: Allergies: Patient reports that his post nasal drip has been worsening.     Patient is a 52-year-old male who presents for follow-up with a chief complaint of allergies and a station Social History: Social History    Tobacco Use      Smoking status: Never Smoker      Smokeless tobacco: Never Used    Vaping Use      Vaping Use: Never used    Alcohol use: Yes      Alcohol/week: 0.0 standard drinks      Comment: social     Drug use:  No lethargy  ENMT:  Negative for ear drainage, hearing loss and nasal drainage  Eyes:  Negative for eye discharge and vision loss  Gastrointestinal:  Negative for abdominal pain, diarrhea and vomiting  Integumentary:  Negative for pruritus and rash  Respirato pain or pressure or congestion. Continue treatment of underlying environmental allergies  Continue with Flonase and Allegra. 3.  Flu vaccine up-to-date this fall    4. Covid vaccination up-to-date x2 doses. Second dose was in May 2021.   Reviewed farzaneh

## 2021-12-01 NOTE — PATIENT INSTRUCTIONS
#1 allergic rhinitis  Recent increase in postnasal drip over the past week. Covid testing was negative at urgent care earlier this week. Add trial of Astelin nasal spray 1 to 2 sprays per nostril twice a day. Continue with Allegra Flonase and Mucinex.

## 2021-12-26 NOTE — TELEPHONE ENCOUNTER
Pending Prescriptions Disp Refills    DICYCLOMINE HCL 20 MG Oral Tab [Pharmacy Med Name: DICYCLOMINE 20MG TABLETS] 90 tablet 0     Sig: TAKE 1 TABLET(20 MG) BY MOUTH THREE TIMES DAILY         See refill request  Patient last seen 7-19-16.    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  - Not on blood thinners per son, no ASA or Plavix  - Monitor for changes in upper extremities

## 2021-12-29 ENCOUNTER — NURSE ONLY (OUTPATIENT)
Dept: ALLERGY | Facility: CLINIC | Age: 61
End: 2021-12-29
Payer: COMMERCIAL

## 2021-12-29 DIAGNOSIS — J30.89 ENVIRONMENTAL AND SEASONAL ALLERGIES: ICD-10-CM

## 2021-12-29 PROCEDURE — 95117 IMMUNOTHERAPY INJECTIONS: CPT | Performed by: ALLERGY & IMMUNOLOGY

## 2022-01-26 ENCOUNTER — NURSE ONLY (OUTPATIENT)
Dept: ALLERGY | Facility: CLINIC | Age: 62
End: 2022-01-26
Payer: COMMERCIAL

## 2022-01-26 DIAGNOSIS — J30.89 ENVIRONMENTAL AND SEASONAL ALLERGIES: ICD-10-CM

## 2022-01-26 PROCEDURE — 95165 ANTIGEN THERAPY SERVICES: CPT | Performed by: ALLERGY & IMMUNOLOGY

## 2022-01-26 PROCEDURE — 95117 IMMUNOTHERAPY INJECTIONS: CPT | Performed by: ALLERGY & IMMUNOLOGY

## 2022-02-23 ENCOUNTER — NURSE ONLY (OUTPATIENT)
Dept: ALLERGY | Facility: CLINIC | Age: 62
End: 2022-02-23
Payer: COMMERCIAL

## 2022-02-23 DIAGNOSIS — J30.89 ENVIRONMENTAL AND SEASONAL ALLERGIES: ICD-10-CM

## 2022-02-23 PROCEDURE — 95117 IMMUNOTHERAPY INJECTIONS: CPT | Performed by: ALLERGY & IMMUNOLOGY

## 2022-02-26 RX ORDER — DICYCLOMINE HCL 20 MG
20 TABLET ORAL 3 TIMES DAILY
Qty: 90 TABLET | Refills: 1 | Status: SHIPPED | OUTPATIENT
Start: 2022-02-26

## 2022-02-26 NOTE — TELEPHONE ENCOUNTER
Refill passed per [facility] protocol.     Requested Prescriptions   Pending Prescriptions Disp Refills    DICYCLOMINE 20 MG Oral Tab [Pharmacy Med Name: DICYCLOMINE 20MG TABLETS] 90 tablet 1     Sig: TAKE 1 TABLET(20 MG) BY MOUTH THREE TIMES DAILY        Gastrointestional Medication Protocol Passed - 2/26/2022  8:50 AM        Passed - Appointment in past 12 or next 3 months              Recent Outpatient Visits              3 days ago Environmental and seasonal allergies    [facility], [address]    Nurse Only    1 month ago Environmental and seasonal allergies    [facility], [address]    Nurse Only    1 month ago Environmental and seasonal allergies    [facility], [address]    Nurse Only    2 months ago Allergic rhinitis due to pollen, unspecified seasonality    [facility], [address] Torrie Milo, MD    Office Visit    3 months ago Environmental and seasonal allergies    [facility], [address]    Nurse Only            Future Appointments         Provider Department Appt Notes    In 3 weeks [address]

## 2022-03-16 ENCOUNTER — OFFICE VISIT (OUTPATIENT)
Dept: FAMILY MEDICINE CLINIC | Facility: CLINIC | Age: 62
End: 2022-03-16
Payer: COMMERCIAL

## 2022-03-16 VITALS
HEART RATE: 66 BPM | SYSTOLIC BLOOD PRESSURE: 144 MMHG | BODY MASS INDEX: 22.43 KG/M2 | TEMPERATURE: 98 F | RESPIRATION RATE: 16 BRPM | WEIGHT: 148 LBS | HEIGHT: 68.3 IN | DIASTOLIC BLOOD PRESSURE: 79 MMHG

## 2022-03-16 DIAGNOSIS — H61.23 BILATERAL IMPACTED CERUMEN: Primary | ICD-10-CM

## 2022-03-16 PROCEDURE — 3078F DIAST BP <80 MM HG: CPT | Performed by: FAMILY MEDICINE

## 2022-03-16 PROCEDURE — 3077F SYST BP >= 140 MM HG: CPT | Performed by: FAMILY MEDICINE

## 2022-03-16 PROCEDURE — 99213 OFFICE O/P EST LOW 20 MIN: CPT | Performed by: FAMILY MEDICINE

## 2022-03-16 PROCEDURE — 3008F BODY MASS INDEX DOCD: CPT | Performed by: FAMILY MEDICINE

## 2022-03-23 ENCOUNTER — NURSE ONLY (OUTPATIENT)
Dept: ALLERGY | Facility: CLINIC | Age: 62
End: 2022-03-23
Payer: COMMERCIAL

## 2022-03-23 DIAGNOSIS — J30.89 ENVIRONMENTAL AND SEASONAL ALLERGIES: ICD-10-CM

## 2022-03-23 PROCEDURE — 95117 IMMUNOTHERAPY INJECTIONS: CPT | Performed by: ALLERGY & IMMUNOLOGY

## 2022-04-12 ENCOUNTER — TELEPHONE (OUTPATIENT)
Dept: FAMILY MEDICINE CLINIC | Facility: CLINIC | Age: 62
End: 2022-04-12

## 2022-04-16 ENCOUNTER — LAB ENCOUNTER (OUTPATIENT)
Dept: LAB | Facility: HOSPITAL | Age: 62
End: 2022-04-16
Attending: FAMILY MEDICINE
Payer: COMMERCIAL

## 2022-04-16 DIAGNOSIS — Z00.00 ROUTINE PHYSICAL EXAMINATION: ICD-10-CM

## 2022-04-16 DIAGNOSIS — E11.9 CONTROLLED TYPE 2 DIABETES MELLITUS WITHOUT COMPLICATION, WITHOUT LONG-TERM CURRENT USE OF INSULIN (HCC): ICD-10-CM

## 2022-04-16 LAB
ALBUMIN SERPL-MCNC: 3.8 G/DL (ref 3.4–5)
ALBUMIN/GLOB SERPL: 1.3 {RATIO} (ref 1–2)
ALP LIVER SERPL-CCNC: 65 U/L
ALT SERPL-CCNC: 21 U/L
ANION GAP SERPL CALC-SCNC: 3 MMOL/L (ref 0–18)
AST SERPL-CCNC: 9 U/L (ref 15–37)
BILIRUB SERPL-MCNC: 0.7 MG/DL (ref 0.1–2)
BUN BLD-MCNC: 16 MG/DL (ref 7–18)
BUN/CREAT SERPL: 22.9 (ref 10–20)
CALCIUM BLD-MCNC: 9.2 MG/DL (ref 8.5–10.1)
CHLORIDE SERPL-SCNC: 105 MMOL/L (ref 98–112)
CHOLEST SERPL-MCNC: 159 MG/DL (ref ?–200)
CO2 SERPL-SCNC: 34 MMOL/L (ref 21–32)
COMPLEXED PSA SERPL-MCNC: 0.5 NG/ML (ref ?–4)
CREAT BLD-MCNC: 0.7 MG/DL
DEPRECATED RDW RBC AUTO: 42.1 FL (ref 35.1–46.3)
ERYTHROCYTE [DISTWIDTH] IN BLOOD BY AUTOMATED COUNT: 12.1 % (ref 11–15)
EST. AVERAGE GLUCOSE BLD GHB EST-MCNC: 140 MG/DL (ref 68–126)
FASTING PATIENT LIPID ANSWER: YES
FASTING STATUS PATIENT QL REPORTED: YES
GLOBULIN PLAS-MCNC: 3 G/DL (ref 2.8–4.4)
GLUCOSE BLD-MCNC: 145 MG/DL (ref 70–99)
HBA1C MFR BLD: 6.5 % (ref ?–5.7)
HCT VFR BLD AUTO: 47.7 %
HDLC SERPL-MCNC: 56 MG/DL (ref 40–59)
HGB BLD-MCNC: 15.7 G/DL
LDLC SERPL CALC-MCNC: 93 MG/DL (ref ?–100)
MCH RBC QN AUTO: 30.8 PG (ref 26–34)
MCHC RBC AUTO-ENTMCNC: 32.9 G/DL (ref 31–37)
MCV RBC AUTO: 93.7 FL
NONHDLC SERPL-MCNC: 103 MG/DL (ref ?–130)
OSMOLALITY SERPL CALC.SUM OF ELEC: 298 MOSM/KG (ref 275–295)
PLATELET # BLD AUTO: 200 10(3)UL (ref 150–450)
POTASSIUM SERPL-SCNC: 4.4 MMOL/L (ref 3.5–5.1)
PROT SERPL-MCNC: 6.8 G/DL (ref 6.4–8.2)
RBC # BLD AUTO: 5.09 X10(6)UL
SODIUM SERPL-SCNC: 142 MMOL/L (ref 136–145)
TRIGL SERPL-MCNC: 44 MG/DL (ref 30–149)
VLDLC SERPL CALC-MCNC: 7 MG/DL (ref 0–30)
WBC # BLD AUTO: 4.9 X10(3) UL (ref 4–11)

## 2022-04-16 PROCEDURE — 80053 COMPREHEN METABOLIC PANEL: CPT

## 2022-04-16 PROCEDURE — 36415 COLL VENOUS BLD VENIPUNCTURE: CPT

## 2022-04-16 PROCEDURE — 80061 LIPID PANEL: CPT

## 2022-04-16 PROCEDURE — 3044F HG A1C LEVEL LT 7.0%: CPT

## 2022-04-16 PROCEDURE — 83036 HEMOGLOBIN GLYCOSYLATED A1C: CPT

## 2022-04-16 PROCEDURE — 85027 COMPLETE CBC AUTOMATED: CPT

## 2022-04-20 ENCOUNTER — NURSE ONLY (OUTPATIENT)
Dept: ALLERGY | Facility: CLINIC | Age: 62
End: 2022-04-20
Payer: COMMERCIAL

## 2022-04-20 DIAGNOSIS — J30.89 ENVIRONMENTAL AND SEASONAL ALLERGIES: ICD-10-CM

## 2022-04-20 PROCEDURE — 95117 IMMUNOTHERAPY INJECTIONS: CPT | Performed by: ALLERGY & IMMUNOLOGY

## 2022-04-22 ENCOUNTER — OFFICE VISIT (OUTPATIENT)
Dept: FAMILY MEDICINE CLINIC | Facility: CLINIC | Age: 62
End: 2022-04-22
Payer: COMMERCIAL

## 2022-04-22 VITALS
HEIGHT: 68 IN | TEMPERATURE: 99 F | WEIGHT: 149.63 LBS | HEART RATE: 76 BPM | BODY MASS INDEX: 22.68 KG/M2 | SYSTOLIC BLOOD PRESSURE: 136 MMHG | DIASTOLIC BLOOD PRESSURE: 78 MMHG | RESPIRATION RATE: 16 BRPM

## 2022-04-22 DIAGNOSIS — J01.91 ACUTE RECURRENT SINUSITIS, UNSPECIFIED LOCATION: Primary | ICD-10-CM

## 2022-04-22 PROCEDURE — 3078F DIAST BP <80 MM HG: CPT

## 2022-04-22 PROCEDURE — 99213 OFFICE O/P EST LOW 20 MIN: CPT

## 2022-04-22 PROCEDURE — 3008F BODY MASS INDEX DOCD: CPT

## 2022-04-22 PROCEDURE — 3075F SYST BP GE 130 - 139MM HG: CPT

## 2022-04-22 RX ORDER — FLUTICASONE PROPIONATE 50 MCG
2 SPRAY, SUSPENSION (ML) NASAL DAILY
Qty: 16 G | Refills: 0 | Status: SHIPPED | OUTPATIENT
Start: 2022-04-22 | End: 2023-04-17

## 2022-04-22 RX ORDER — AMOXICILLIN AND CLAVULANATE POTASSIUM 875; 125 MG/1; MG/1
1 TABLET, FILM COATED ORAL 2 TIMES DAILY
Qty: 20 TABLET | Refills: 0 | Status: SHIPPED | OUTPATIENT
Start: 2022-04-22 | End: 2022-05-02

## 2022-04-25 ENCOUNTER — TELEPHONE (OUTPATIENT)
Dept: FAMILY MEDICINE CLINIC | Facility: CLINIC | Age: 62
End: 2022-04-25

## 2022-04-30 ENCOUNTER — TELEPHONE (OUTPATIENT)
Dept: FAMILY MEDICINE CLINIC | Facility: CLINIC | Age: 62
End: 2022-04-30

## 2022-05-02 ENCOUNTER — TELEPHONE (OUTPATIENT)
Dept: FAMILY MEDICINE CLINIC | Facility: CLINIC | Age: 62
End: 2022-05-02

## 2022-05-02 RX ORDER — BENZONATATE 100 MG/1
100 CAPSULE ORAL 3 TIMES DAILY PRN
Qty: 15 CAPSULE | Refills: 0 | Status: SHIPPED | OUTPATIENT
Start: 2022-05-02

## 2022-05-02 NOTE — TELEPHONE ENCOUNTER
Tish Chaudhari [phone] RE DR SWAIN PT, COUGHING, CONGESTED,   62  Paged at number :  PAGE: [phone] at :   19:38

## 2022-05-02 NOTE — TELEPHONE ENCOUNTER
On call paged. Late entry. Pt was concerned that he was not prescribed enough Amoxicillin. Told sister dose was 125 mg. Explained to pt that is 2nd med. Amoxicillin is 875 mg. Speaking in complete sentences with no distress. Finish off medications and if not improved, follow up. Pt agreed.

## 2022-05-02 NOTE — TELEPHONE ENCOUNTER
No further antibiotic is needed. Benzonatate 100 mg three times a day as needed for cough sent to pharmacy  Continue Flonase and start Claritin, Zyrtec, or Allegra daily.

## 2022-05-02 NOTE — TELEPHONE ENCOUNTER
Pt stated he was seen 4/22/22- still have s/s after finishing- nasal congestion,post nasal drip, coughing while speaking, no further sneezing , taking mucinex cough syrup, cough drops , using nasal spray   Pt at work, can detail message or send to [employer]

## 2022-05-02 NOTE — TELEPHONE ENCOUNTER
See also telephone encounter 4/30/22 on call. Per patient he spoke with the on call physician  Dr Jia Luque regarding his antibiotic dose and this is nothing to do with his ongoing symptoms. States that he is still coughing with nasal dripping, completed his antibiotic last night and he is still using his Flonase. Asking if he needs to get a second round of antibiotic. Informed that  we are still waiting for Keily's response.        Please reply to pool: PAMELA Bales

## 2022-05-05 ENCOUNTER — LAB ENCOUNTER (OUTPATIENT)
Dept: LAB | Facility: HOSPITAL | Age: 62
End: 2022-05-05
Attending: FAMILY MEDICINE
Payer: COMMERCIAL

## 2022-05-05 ENCOUNTER — TELEPHONE (OUTPATIENT)
Dept: FAMILY MEDICINE CLINIC | Facility: CLINIC | Age: 62
End: 2022-05-05

## 2022-05-05 DIAGNOSIS — Z20.822 ENCOUNTER FOR SCREENING LABORATORY TESTING FOR COVID-19 VIRUS IN ASYMPTOMATIC PATIENT: ICD-10-CM

## 2022-05-05 NOTE — TELEPHONE ENCOUNTER
Spoke with pt,  verified, pt stated he is done taking abx ( amox clavulanate). Pt on benzonate but still not feeling better, pt been sick for 2 weeks now. Pt still has dry annoying cough and feel something is not right. Pt tried mucinex DM with minimal relieved before but now, also on flonase, allergy meds [employer] brand, ineffective. Pt is looking for f/u virtual appt with PCP or poss order for covid test, can we add pt in today? Covid order pended. pls advise, thanks in advance.    LOV 22

## 2022-05-05 NOTE — TELEPHONE ENCOUNTER
Pt contacted and after discussion, will check COVID testing. Order placed. Follow up and further management after testing  Not much better after abx and other medications. No sig SOB or fevers but lingering cough. Patient verbalized understanding of recommendations and agrees to plan.

## 2022-05-06 ENCOUNTER — TELEPHONE (OUTPATIENT)
Dept: FAMILY MEDICINE CLINIC | Facility: CLINIC | Age: 62
End: 2022-05-06

## 2022-05-06 LAB — SARS-COV-2 RNA RESP QL NAA+PROBE: DETECTED

## 2022-05-06 NOTE — TELEPHONE ENCOUNTER
Patient called again. He asked to speak to Dr. Vilma Harper. Relayed that providers don't check really check their messages on days off. He is ultimately feeling better than yesterday. He just wants to make sure that he is doing the right things. Went over home care again. Asked him to call if his symptoms worsened or if he had any further questions. He verbalized understanding.

## 2022-05-06 NOTE — TELEPHONE ENCOUNTER
Spoke with pt,  verified. Pt stated he tested positive for covid yesterday, what else he can do? Pt has cough, he takes benzonatate. Pt is speaking in complete sentences with no distress. Pt is vaccinated with booster. Pt requested to speak to Dr Samia Tolliver, he was informed PCP is out of office today and will return Monday. Pt was provided below recommendation, he stated understanding. FYI      10 Ways to Manage Your Health at Home       Day 1-5 stay home from work, school, and away from other public places. If you must go out, avoid using any kind of public transportation, ridesharing, or taxis. Day 6-10 if sx is improving and have no sx , he may leave the house and continue to wear mask around people. Monitor your symptoms carefully. If your symptoms get worse, call your healthcare provider immediately. Get rest and stay hydrated. If you have a medical appointment, call the healthcare provider ahead of time and tell them that you have or may have COVID-19. For medical emergencies, call 911 and notify the dispatch personnel that you have or may have COVID-19. Cover your cough and sneezes. Wash your hands often with soap and water for at least 20 seconds or clean your hands with an alcohol-based hand  that contains at least 60% alcohol. As much as possible, stay in a specific room and away from other people in your home. Also, you should use a separate bathroom, if available. If you need to be around other people in or outside of the home, wear a facemask. Avoid sharing personal items with other people in your household, like dishes, towels, and bedding   Clean all surfaces that are touched often, like counters, tabletops, and doorknobs. Use household cleaning sprays or wipes according to the label instructions.      You can also get more information at the following websites:   Centers for Disease Control & Prevention (CDC)  What to do if you are sick with coronavirus disease 2019, BMEYE.[employer].pt. pdf  Centers for Disease Control & Prevention (CDC)  10 things you can do to manage your health at home, JenikeExchange.nl. pdf  All Together Now.[employer].cy  http://www.[facility].[place].gov/topics-services/diseases-and-conditions/diseases-a-z-list/coronavirus  https://www.cdc.gov/coronavirus/2019-ncov/

## 2022-05-07 NOTE — TELEPHONE ENCOUNTER
Message noted.  To monitor symptoms, if sig SOB or feeling worse, can go to ER / immediate for further evaluation

## 2022-05-13 ENCOUNTER — TELEPHONE (OUTPATIENT)
Dept: FAMILY MEDICINE CLINIC | Facility: CLINIC | Age: 62
End: 2022-05-13

## 2022-05-13 ENCOUNTER — TELEMEDICINE (OUTPATIENT)
Dept: FAMILY MEDICINE CLINIC | Facility: CLINIC | Age: 62
End: 2022-05-13
Payer: COMMERCIAL

## 2022-05-13 DIAGNOSIS — U07.1 COVID-19 VIRUS INFECTION: Primary | ICD-10-CM

## 2022-05-13 PROCEDURE — 99212 OFFICE O/P EST SF 10 MIN: CPT | Performed by: PHYSICIAN ASSISTANT

## 2022-05-13 NOTE — TELEPHONE ENCOUNTER
Action Requested: Summary for Provider   []  Critical Lab, Recommendations Needed  [] Need Additional Advice  [x]   FYI    []   Need Orders  [] Need Medications Sent to Pharmacy  []  Other     SUMMARY:   Spoke with pt, DONALD verified, pt tested positive for covid on 22. He already quarantined for 6 days, he wants to know if he can get re tested for covid prior returning to work. Pt still has a little congestion, runny nose. Pt takes OTC mucinex DM. Pt was informed per CDC guidelines re tested is not recommended. Pt was advised to [facility] and provided with below recommendations. He was offered a virtual appt for eval and he agreed, appt made and instruction given. Pt stated understanding. Future Appointments   Date Time Provider Jose Gilliam   2022  1:15 PM Berta Araya [facility] [place]   2022  2:30 PM [facility] [place]   6/15/2022  2:30 PM [facility] [place]   2022  2:30 PM [facility] [place]   8/10/2022  2:30 PM [facility] [place]             10 Ways to [facility] at Home       Stay home from work, school, and away from other public places. If you must go out, avoid using any kind of public transportation, ridesharing, or taxis. Monitor your symptoms carefully. If your symptoms get worse, call your healthcare provider immediately. Get rest and stay hydrated. If you have a medical appointment, call the healthcare provider ahead of time and tell them that you have or may have COVID-19. For medical emergencies, call 911 and notify the dispatch personnel that you have or may have COVID-19. Cover your cough and sneezes. Wash your hands often with soap and water for at least 20 seconds or clean your hands with an alcohol-based hand  that contains at least 60% alcohol. As much as possible, stay in a specific room and away from other people in your home.  Also, you should use a separate bathroom, if available. If you need to be around other people in or outside of the home, wear a facemask. Avoid sharing personal items with other people in your household, like dishes, towels, and bedding   Clean all surfaces that are touched often, like counters, tabletops, and doorknobs. Use household cleaning sprays or wipes according to the label instructions. You can also get more information at the following websites:   Centers for Disease Control & Prevention (CDC)  What to do if you are sick with coronavirus disease 2019, IceRocket.pt. pdf  Centers for Disease Control & Prevention (CDC)  10 things you can do to manage your health at home, PixtaaokeExchange.nl. pdf  [employer].AlphaSmart.cy  http://www.[facility].[place].gov/topics-services/diseases-and-conditions/diseases-a-z-list/coronavirus  https://www.cdc.gov/coronavirus/2019-ncov/

## 2022-05-14 ENCOUNTER — TELEPHONE (OUTPATIENT)
Dept: FAMILY MEDICINE CLINIC | Facility: CLINIC | Age: 62
End: 2022-05-14

## 2022-05-14 NOTE — TELEPHONE ENCOUNTER
Message noted. Can continue mucinex as needed for congestion. If doing well, should be able to return to work on Monday. Do not need to retest unless his work requires this. As far as booster, he can do booster 6 months after his last COVID shot.

## 2022-05-14 NOTE — TELEPHONE ENCOUNTER
Patient called with questions regarding Covid. Patient has been quarantining for 7 days post positive test. He is planning to go back to work Monday. Patient states that he is still taking Mucinex DM which is helping with his congestion. Would like to know if he should continue taking it or of there is something else he should take? The congestion is his only symptom. Also questioning when he should get his Covid booster shot. Also asking about retesting for Covid. CDC guidelines discussed with patient.     [email or web address]

## 2022-05-18 ENCOUNTER — NURSE ONLY (OUTPATIENT)
Dept: ALLERGY | Facility: CLINIC | Age: 62
End: 2022-05-18
Payer: COMMERCIAL

## 2022-05-18 DIAGNOSIS — J30.89 ENVIRONMENTAL AND SEASONAL ALLERGIES: ICD-10-CM

## 2022-05-18 PROCEDURE — 95117 IMMUNOTHERAPY INJECTIONS: CPT | Performed by: ALLERGY & IMMUNOLOGY

## 2022-06-02 RX ORDER — GLUCOSAM/CHON-MSM1/C/MANG/BOSW 500-416.6
TABLET ORAL
Qty: 100 EACH | Refills: 3 | Status: SHIPPED | OUTPATIENT
Start: 2022-06-02

## 2022-06-12 RX ORDER — GLYBURIDE-METFORMIN HYDROCHLORIDE 2.5; 5 MG/1; MG/1
1 TABLET ORAL
Qty: 90 TABLET | Refills: 1 | Status: SHIPPED | OUTPATIENT
Start: 2022-06-12 | End: 2022-12-08

## 2022-06-12 NOTE — TELEPHONE ENCOUNTER
Refill passed per [facility] protocol.     Requested Prescriptions   Pending Prescriptions Disp Refills    GLYBURIDE-METFORMIN 2.5-500 MG Oral Tab [Pharmacy Med Name: GLYBURIDE/METFORMIN 2.5/500MG TABS] 90 tablet 1     Sig: Take 1 tablet by mouth daily with breakfast.        Diabetes Medication Protocol Passed - 6/12/2022 10:06 AM        Passed - Last A1C < 7.5 and within past 6 months     Lab Results   Component Value Date    A1C 6.5 (H) 04/16/2022               Passed - Appointment in past 6 or next 3 months        Passed - GFR Non- > 50     Lab Results   Component Value Date    GFRNAA 103 04/16/2022                 Passed - GFR in the past 12 months              Recent Outpatient Visits              3 weeks ago Environmental and seasonal allergies    [facility], [address]    Nurse Only    1 month ago COVID-19 virus infection    [facility]    Telemedicine    1 month ago Acute recurrent sinusitis, unspecified location    [facility], [address] [place] [employer] Visit    1 month ago Environmental and seasonal allergies    [place] [employer] [facility], [address]    Nurse Only    2 months ago Environmental and seasonal allergies    [facility], [address]    Nurse Only            Future Appointments         Provider Department Appt Notes    In 3 days [address]     In 1 month [address]     In 1 month [address], [place]

## 2022-06-14 ENCOUNTER — TELEPHONE (OUTPATIENT)
Dept: FAMILY MEDICINE CLINIC | Facility: CLINIC | Age: 62
End: 2022-06-14

## 2022-06-14 NOTE — TELEPHONE ENCOUNTER
Patient called. His Roommate is positive for covid. He just got over covid (was positive on 5/5/22)  He has some nasal congestion only; no other symptoms. He has allergies. Patient informed not recommended to re-test for covid within 90 days. Patient to monitor for any new symptoms and if significant cough, sob, call office. Keep distance from his roommate. Hand wash.

## 2022-06-15 ENCOUNTER — NURSE ONLY (OUTPATIENT)
Dept: ALLERGY | Facility: CLINIC | Age: 62
End: 2022-06-15
Payer: COMMERCIAL

## 2022-06-15 DIAGNOSIS — J30.89 ENVIRONMENTAL AND SEASONAL ALLERGIES: ICD-10-CM

## 2022-06-15 PROCEDURE — 95117 IMMUNOTHERAPY INJECTIONS: CPT | Performed by: ALLERGY & IMMUNOLOGY

## 2022-06-15 PROCEDURE — 95165 ANTIGEN THERAPY SERVICES: CPT | Performed by: ALLERGY & IMMUNOLOGY

## 2022-06-20 RX ORDER — CALCIUM CITRATE/VITAMIN D3 200MG-6.25
TABLET ORAL
Qty: 100 STRIP | Refills: 3 | Status: SHIPPED | OUTPATIENT
Start: 2022-06-20

## 2022-06-22 ENCOUNTER — OFFICE VISIT (OUTPATIENT)
Dept: FAMILY MEDICINE CLINIC | Facility: CLINIC | Age: 62
End: 2022-06-22
Payer: COMMERCIAL

## 2022-06-22 VITALS
WEIGHT: 151 LBS | BODY MASS INDEX: 22.88 KG/M2 | HEART RATE: 76 BPM | DIASTOLIC BLOOD PRESSURE: 73 MMHG | RESPIRATION RATE: 16 BRPM | TEMPERATURE: 98 F | SYSTOLIC BLOOD PRESSURE: 126 MMHG | HEIGHT: 68 IN

## 2022-06-22 DIAGNOSIS — E11.9 TYPE 2 DIABETES MELLITUS WITHOUT COMPLICATION, WITHOUT LONG-TERM CURRENT USE OF INSULIN (HCC): ICD-10-CM

## 2022-06-22 DIAGNOSIS — Z00.00 ROUTINE PHYSICAL EXAMINATION: Primary | ICD-10-CM

## 2022-06-22 DIAGNOSIS — J30.1 SEASONAL ALLERGIC RHINITIS DUE TO POLLEN: ICD-10-CM

## 2022-06-22 PROCEDURE — 3074F SYST BP LT 130 MM HG: CPT | Performed by: FAMILY MEDICINE

## 2022-06-22 PROCEDURE — 3008F BODY MASS INDEX DOCD: CPT | Performed by: FAMILY MEDICINE

## 2022-06-22 PROCEDURE — 99396 PREV VISIT EST AGE 40-64: CPT | Performed by: FAMILY MEDICINE

## 2022-06-22 PROCEDURE — 3078F DIAST BP <80 MM HG: CPT | Performed by: FAMILY MEDICINE

## 2022-07-13 ENCOUNTER — NURSE ONLY (OUTPATIENT)
Dept: ALLERGY | Facility: CLINIC | Age: 62
End: 2022-07-13
Payer: COMMERCIAL

## 2022-07-13 DIAGNOSIS — J30.89 ENVIRONMENTAL AND SEASONAL ALLERGIES: ICD-10-CM

## 2022-07-13 PROCEDURE — 95117 IMMUNOTHERAPY INJECTIONS: CPT | Performed by: ALLERGY & IMMUNOLOGY

## 2022-08-10 ENCOUNTER — NURSE ONLY (OUTPATIENT)
Dept: ALLERGY | Facility: CLINIC | Age: 62
End: 2022-08-10
Payer: COMMERCIAL

## 2022-08-10 DIAGNOSIS — J30.89 ENVIRONMENTAL AND SEASONAL ALLERGIES: ICD-10-CM

## 2022-08-10 PROCEDURE — 95117 IMMUNOTHERAPY INJECTIONS: CPT | Performed by: ALLERGY & IMMUNOLOGY

## 2022-08-25 RX ORDER — DICYCLOMINE HCL 20 MG
20 TABLET ORAL 3 TIMES DAILY
Qty: 270 TABLET | Refills: 1 | Status: SHIPPED | OUTPATIENT
Start: 2022-08-25 | End: 2023-01-10

## 2022-08-25 NOTE — TELEPHONE ENCOUNTER
[place]ill passed per [place] [facility] protocol.      Requested Prescriptions   Pending Prescriptions Disp Refills    DICYCLOMINE 20 MG Oral Tab [Pharmacy Med Name: DICYCLOMINE 20MG TABLETS] 90 tablet 1     Sig: TAKE 1 TABLET(20 MG) BY MOUTH THREE TIMES DAILY        Gastrointestional Medication Protocol Passed - 8/24/2022  4:00 AM        Passed - In person appointment or virtual visit in the past 12 mos or appointment in next 3 mos       Recent Outpatient Visits              2 weeks ago Environmental and seasonal allergies    [facility], [address]    Nurse Only    1 month ago Environmental and seasonal allergies    [facility], [address]    Nurse Only    2 months ago Routine physical examination    Oly Christianson MD    Office Visit    2 months ago Environmental and seasonal allergies    [facility], [address]    Nurse Only    3 months ago Environmental and seasonal allergies    [facility], [address]    Nurse Only     Future Appointments         Provider Department Appt Notes    In 1 week [address] [facility], [address]                       Future Appointments         Provider Department Appt Notes    In 1 week [address] [facility], [address], [address] Visits              2 weeks ago Environmental and seasonal allergies    [facility], [address] Only    1 month ago Environmental and seasonal allergies    [facility], [address] Only    2 months ago Routine physical examination    Oly Christianson MD    Office Visit    2 months ago Environmental and seasonal allergies    [facility], [address]    Nurse Only    3 months ago Environmental and seasonal allergies    [facility], [address]    Nurse Only

## 2022-09-07 ENCOUNTER — NURSE ONLY (OUTPATIENT)
Dept: ALLERGY | Facility: CLINIC | Age: 62
End: 2022-09-07
Payer: COMMERCIAL

## 2022-09-07 DIAGNOSIS — J30.89 ENVIRONMENTAL AND SEASONAL ALLERGIES: ICD-10-CM

## 2022-09-07 PROCEDURE — 95117 IMMUNOTHERAPY INJECTIONS: CPT | Performed by: ALLERGY & IMMUNOLOGY

## 2022-10-05 ENCOUNTER — TELEPHONE (OUTPATIENT)
Dept: ALLERGY | Facility: CLINIC | Age: 62
End: 2022-10-05

## 2022-10-05 ENCOUNTER — NURSE ONLY (OUTPATIENT)
Dept: ALLERGY | Facility: CLINIC | Age: 62
End: 2022-10-05
Payer: COMMERCIAL

## 2022-10-05 DIAGNOSIS — J30.89 ENVIRONMENTAL AND SEASONAL ALLERGIES: ICD-10-CM

## 2022-10-05 PROCEDURE — 95117 IMMUNOTHERAPY INJECTIONS: CPT | Performed by: ALLERGY & IMMUNOLOGY

## 2022-10-05 NOTE — TELEPHONE ENCOUNTER
Patient requesting call to ask how many injections are in box.  Not available from 1-3:30 PM on 10/05/22

## 2022-10-06 ENCOUNTER — TELEPHONE (OUTPATIENT)
Dept: FAMILY MEDICINE CLINIC | Facility: CLINIC | Age: 62
End: 2022-10-06

## 2022-10-06 DIAGNOSIS — E11.9 CONTROLLED TYPE 2 DIABETES MELLITUS WITHOUT COMPLICATION, WITHOUT LONG-TERM CURRENT USE OF INSULIN (HCC): Primary | ICD-10-CM

## 2022-10-06 NOTE — TELEPHONE ENCOUNTER
Spoke with patient. Verified name and . Patient is asking for clarification for bill for allergy injections as he was informed  By billing he was charged for 10 injections in a box. Clarified with patient that our office charges every 10 doses for the injections. Patient verbalizes understanding, no further questions at this time.

## 2022-10-12 ENCOUNTER — LAB ENCOUNTER (OUTPATIENT)
Dept: LAB | Age: 62
End: 2022-10-12
Attending: FAMILY MEDICINE
Payer: COMMERCIAL

## 2022-10-12 DIAGNOSIS — E11.9 CONTROLLED TYPE 2 DIABETES MELLITUS WITHOUT COMPLICATION, WITHOUT LONG-TERM CURRENT USE OF INSULIN (HCC): ICD-10-CM

## 2022-10-12 LAB
EST. AVERAGE GLUCOSE BLD GHB EST-MCNC: 148 MG/DL (ref 68–126)
HBA1C MFR BLD: 6.8 % (ref ?–5.7)

## 2022-10-12 PROCEDURE — 83036 HEMOGLOBIN GLYCOSYLATED A1C: CPT

## 2022-10-12 PROCEDURE — 36415 COLL VENOUS BLD VENIPUNCTURE: CPT

## 2022-11-02 ENCOUNTER — NURSE ONLY (OUTPATIENT)
Dept: ALLERGY | Facility: CLINIC | Age: 62
End: 2022-11-02
Payer: COMMERCIAL

## 2022-11-02 DIAGNOSIS — J30.89 ENVIRONMENTAL AND SEASONAL ALLERGIES: ICD-10-CM

## 2022-11-02 PROCEDURE — 95117 IMMUNOTHERAPY INJECTIONS: CPT | Performed by: ALLERGY & IMMUNOLOGY

## 2022-11-02 PROCEDURE — 95165 ANTIGEN THERAPY SERVICES: CPT | Performed by: ALLERGY & IMMUNOLOGY

## 2022-11-07 RX ORDER — CALCIUM CITRATE/VITAMIN D3 200MG-6.25
TABLET ORAL
Qty: 100 STRIP | Refills: 3 | OUTPATIENT
Start: 2022-11-07

## 2022-11-08 ENCOUNTER — TELEPHONE (OUTPATIENT)
Dept: FAMILY MEDICINE CLINIC | Facility: CLINIC | Age: 62
End: 2022-11-08

## 2022-11-08 NOTE — TELEPHONE ENCOUNTER
Patient reports he had his COVID booster 11/5/22. Yesterday patient started feeling feverish, had a few episodes of diarrhea and feels \"sluggish. \" Patient will continue to monitor and let us know if he develops fever, cough or any other concerning symptoms.

## 2022-11-22 ENCOUNTER — TELEPHONE (OUTPATIENT)
Dept: FAMILY MEDICINE CLINIC | Facility: CLINIC | Age: 62
End: 2022-11-22

## 2022-11-22 NOTE — TELEPHONE ENCOUNTER
Patient notified that as of 1/2023 that we will not lonfer accept Ambetter insured by [employer]. Patient was informed to contact insurance to see what offer they have.

## 2022-11-30 ENCOUNTER — NURSE ONLY (OUTPATIENT)
Dept: ALLERGY | Facility: CLINIC | Age: 62
End: 2022-11-30
Payer: COMMERCIAL

## 2022-11-30 DIAGNOSIS — J30.89 ENVIRONMENTAL AND SEASONAL ALLERGIES: ICD-10-CM

## 2022-11-30 PROCEDURE — 95117 IMMUNOTHERAPY INJECTIONS: CPT | Performed by: ALLERGY & IMMUNOLOGY

## 2022-12-03 ENCOUNTER — TELEPHONE (OUTPATIENT)
Dept: ALLERGY | Facility: CLINIC | Age: 62
End: 2022-12-03

## 2022-12-03 NOTE — TELEPHONE ENCOUNTER
Left note for patient that he needs to call billing to verify that his payment posted. Once that posts everything should be fine. Things were billed appropriately.

## 2022-12-03 NOTE — TELEPHONE ENCOUNTER
----- Message from Andres Nayeli sent at 12/1/2022 12:57 PM CST -----  Regarding: RE: billing  Hi [employer]! Hope you had a great holiday. I was able to pull up the recent charges but it was under different guarantor#( Philip Puga [[id number]] ). Maybe Epic update had something to do with why you couldn't see the new charges right away? Try checking via transaction inquiry and let me know if you don't know what I mean and I can help you. But the only charge I see for Oct is [address]. I only see [id number] being billed for 11/2 DOS. Also I included Dale Oviedo) on this message because she has now taken over the coding for Allergy going forward. Please let me know if you have any questions. Thanks so much! Padmini Romero  ----- Message -----  From: Norberto Yan RN  Sent: 11/30/2022  11:07 AM CST  To: Andres Nayeli  Subject: billing                                          Hi Padmini Romero,   Would you be able to look into this patient's billing? I am unable to pull up any recent billed charges (I can't see anything after June 2021) which I have never encountered before. Patient reports he was billed a serum charge billing code [id number] last month (for October injections I believe), which he had a balance of $90. He paid. That same billing code was charged again for the 11/2/22 visit. According to our paper chart he should have been billed on 11/2/22 for serum, but not for October. Is this something you are able to look into? Thank you!   Brooke Marinelli

## 2022-12-06 ENCOUNTER — NURSE TRIAGE (OUTPATIENT)
Dept: FAMILY MEDICINE CLINIC | Facility: CLINIC | Age: 62
End: 2022-12-06

## 2022-12-08 RX ORDER — GLYBURIDE-METFORMIN HYDROCHLORIDE 2.5; 5 MG/1; MG/1
1 TABLET ORAL
Qty: 90 TABLET | Refills: 1 | Status: SHIPPED | OUTPATIENT
Start: 2022-12-08

## 2022-12-08 NOTE — TELEPHONE ENCOUNTER
Refill passed per [facility] protocol.   Requested Prescriptions   Pending Prescriptions Disp Refills    GLYBURIDE-METFORMIN 2.5-500 MG Oral Tab [Pharmacy Med Name: GLYBURIDE/METFORMIN 2.5/500MG TABS] 90 tablet 1     Sig: Take 1 tablet by mouth daily with breakfast.       Diabetes Medication Protocol Passed - 12/8/2022  5:06 AM        Passed - Last A1C < 7.5 and within past 6 months     Lab Results   Component Value Date    A1C 6.8 (H) 10/12/2022             Passed - In person appointment or virtual visit in the past 6 mos or appointment in next 3 mos     Recent Outpatient Visits              1 week ago Environmental and seasonal allergies    [place] [facility], [address] Only    1 month ago Environmental and seasonal allergies    [facility], EARLENE [employer]    Nurse Only    2 months ago Environmental and seasonal allergies    [facility], YASMANYLAINEHERNANDEZ [employer]    Nurse Only    3 months ago Environmental and seasonal allergies    [facility], YASMANYLAALBERTINA [employer]    Nurse Only    4 months ago Environmental and seasonal allergies    [facility], OULAINEHERNANDEZ, [employer]    Nurse Only          Future Appointments         Provider Department Appt Notes    In 2 weeks [address] [facility], [place]     In 2 weeks Audrey Pagan MD [place] [facility], [facility] [address] [place] [address] or GFRNAA > 50     GFR Evaluation  GFRNAA: 103 , resulted on 4/16/2022          Passed - GFR in the past 12 months           Recent Outpatient Visits              1 week ago Environmental and seasonal allergies    [facility], [address] Only    1 month ago Environmental and seasonal allergies    [facility], YASMANYLAINEHERNANDEZ, [employer]    Nurse Only    2 months ago Environmental and seasonal allergies    [facility], OULAINEHERNANDEZ, [employer]    Nurse Only    3 months ago Environmental and seasonal allergies    [facility], [address]    Nurse Only    4 months ago Environmental and seasonal allergies    [address], [address] [facility] [address]    Nurse Only          Future Appointments         Provider Department Appt Notes    In 2 weeks [address], [place]     In 2 weeks Guilherme Reese MD [address], [facility], [address] [place] [address]

## 2022-12-28 ENCOUNTER — VIRTUAL PHONE E/M (OUTPATIENT)
Dept: ALLERGY | Facility: CLINIC | Age: 62
End: 2022-12-28
Payer: COMMERCIAL

## 2022-12-28 ENCOUNTER — NURSE ONLY (OUTPATIENT)
Dept: ALLERGY | Facility: CLINIC | Age: 62
End: 2022-12-28
Payer: COMMERCIAL

## 2022-12-28 DIAGNOSIS — J30.1 ALLERGIC RHINITIS DUE TO POLLEN, UNSPECIFIED SEASONALITY: Primary | ICD-10-CM

## 2022-12-28 DIAGNOSIS — Z91.048 ALLERGY TO MOLD: ICD-10-CM

## 2022-12-28 DIAGNOSIS — H69.83 DYSFUNCTION OF BOTH EUSTACHIAN TUBES: ICD-10-CM

## 2022-12-28 DIAGNOSIS — Z92.29 COVID-19 VACCINE SERIES COMPLETED: ICD-10-CM

## 2022-12-28 DIAGNOSIS — J30.89 ENVIRONMENTAL AND SEASONAL ALLERGIES: ICD-10-CM

## 2022-12-28 DIAGNOSIS — Z23 FLU VACCINE NEED: ICD-10-CM

## 2022-12-28 PROCEDURE — 95117 IMMUNOTHERAPY INJECTIONS: CPT | Performed by: ALLERGY & IMMUNOLOGY

## 2022-12-28 PROCEDURE — 99442 PHONE E/M BY PHYS 11-20 MIN: CPT | Performed by: ALLERGY & IMMUNOLOGY

## 2023-01-04 ENCOUNTER — OFFICE VISIT (OUTPATIENT)
Dept: FAMILY MEDICINE CLINIC | Facility: CLINIC | Age: 63
End: 2023-01-04
Payer: COMMERCIAL

## 2023-01-04 VITALS
OXYGEN SATURATION: 98 % | DIASTOLIC BLOOD PRESSURE: 77 MMHG | WEIGHT: 150.5 LBS | HEIGHT: 68 IN | RESPIRATION RATE: 20 BRPM | TEMPERATURE: 98 F | HEART RATE: 70 BPM | SYSTOLIC BLOOD PRESSURE: 139 MMHG | BODY MASS INDEX: 22.81 KG/M2

## 2023-01-04 DIAGNOSIS — R05.9 COUGH, UNSPECIFIED TYPE: Primary | ICD-10-CM

## 2023-01-04 DIAGNOSIS — B34.9 VIRAL ILLNESS: ICD-10-CM

## 2023-01-04 PROCEDURE — 99202 OFFICE O/P NEW SF 15 MIN: CPT | Performed by: NURSE PRACTITIONER

## 2023-01-04 PROCEDURE — 3008F BODY MASS INDEX DOCD: CPT | Performed by: NURSE PRACTITIONER

## 2023-01-04 PROCEDURE — 3075F SYST BP GE 130 - 139MM HG: CPT | Performed by: NURSE PRACTITIONER

## 2023-01-04 PROCEDURE — 3078F DIAST BP <80 MM HG: CPT | Performed by: NURSE PRACTITIONER

## 2023-01-04 PROCEDURE — 87637 SARSCOV2&INF A&B&RSV AMP PRB: CPT | Performed by: NURSE PRACTITIONER

## 2023-01-05 LAB
FLUAV + FLUBV RNA SPEC NAA+PROBE: NOT DETECTED
FLUAV + FLUBV RNA SPEC NAA+PROBE: NOT DETECTED
RSV RNA SPEC NAA+PROBE: NOT DETECTED
SARS-COV-2 RNA RESP QL NAA+PROBE: NOT DETECTED

## 2023-01-09 DIAGNOSIS — E11.9 CONTROLLED TYPE 2 DIABETES MELLITUS WITHOUT COMPLICATION, WITHOUT LONG-TERM CURRENT USE OF INSULIN (HCC): ICD-10-CM

## 2023-01-10 RX ORDER — CALCIUM CITRATE/VITAMIN D3 200MG-6.25
1 TABLET ORAL DAILY
Qty: 100 STRIP | Refills: 3 | Status: SHIPPED | OUTPATIENT
Start: 2023-01-10

## 2023-01-10 RX ORDER — GLYBURIDE-METFORMIN HYDROCHLORIDE 2.5; 5 MG/1; MG/1
1 TABLET ORAL
Qty: 90 TABLET | Refills: 0 | Status: SHIPPED | OUTPATIENT
Start: 2023-01-10

## 2023-01-10 RX ORDER — DICYCLOMINE HCL 20 MG
20 TABLET ORAL 3 TIMES DAILY
Qty: 270 TABLET | Refills: 1 | Status: SHIPPED | OUTPATIENT
Start: 2023-01-10

## 2023-01-10 RX ORDER — GLUCOSAM/CHON-MSM1/C/MANG/BOSW 500-416.6
TABLET ORAL
Qty: 100 EACH | Refills: 3 | Status: SHIPPED | OUTPATIENT
Start: 2023-01-10

## 2023-01-10 NOTE — TELEPHONE ENCOUNTER
[facility]. Requested Prescriptions   Pending Prescriptions Disp Refills    glyBURIDE-metFORMIN 2.5-500 MG Oral Tab 90 tablet 1     Sig: Take 1 tablet by mouth daily with breakfast.       Diabetes Medication Protocol Failed - 2023  5:39 PM        Failed - In person appointment or virtual visit in the past 6 mos or appointment in next 3 mos     Recent Outpatient Visits              6 days ago Cough, unspecified type    [facility] PRECIOUS [place]    Office Visit    1 week ago Environmental and seasonal allergies    [facility], [address]    Nurse Only    1 week ago Allergic rhinitis due to pollen, unspecified seasonality    [facility], [address] Maryln Sander, MD    [employer] E/M    1 month ago Environmental and seasonal allergies    [facility], [address] [place], [employer]    Nurse Only    2 months ago Environmental and seasonal allergies    [facility], [address] [place], [employer]    Nurse Only          Future Appointments         Provider Department Appt Notes    In 2 weeks [address], [address] [place], [place] Allergy Injection    In 1 month [address], [address] [place], [place] [facility] Injection    In 2 months [address], [address] [place], [place] Allergy Injection               Passed - Last A1C < 7.5 and within past 6 months     Lab Results   Component Value Date    A1C 6.8 (H) 10/12/2022             Passed - EGFRCR or GFRNAA > 50     GFR Evaluation  GFRNAA: 103 , resulted on 2022          Passed - GFR in the past 12 months         Signed Prescriptions Disp Refills    Glucose Blood (TRUE METRIX BLOOD GLUCOSE TEST) In Vitro Strip 100 strip 3     Si strip by In Vitro route daily.        Diabetic Supplies Protocol Passed - 2023  5:39 PM        Passed - In person appointment or virtual visit in the past 12 mos or appointment in next 3 mos Recent Outpatient Visits              6 days ago Cough, unspecified type    [facility]    Office Visit    1 week ago Environmental and seasonal allergies    [facility], [address]    Nurse Only    1 week ago Allergic rhinitis due to pollen, unspecified seasonality    [facility], [address] Presley Jungling, MD    [employer] E/M    1 month ago Environmental and seasonal allergies    [facility], [address]    Nurse Only    2 months ago Environmental and seasonal allergies    [facility], [address]    Nurse Only          Future Appointments         Provider Department Appt Notes    In 2 weeks [address], [address] [place] Allergy Injection    In 1 month [address], [address] [place] [facility]ergy Injection    In 2 months [address] [facility] [address], [address] [place] Allergy Injection                 dicyclomine 20 MG Oral Tab 270 tablet 1     Sig: Take 1 tablet (20 mg total) by mouth 3 (three) times daily.        Gastrointestional Medication Protocol Passed - 1/9/2023  5:39 PM        Passed - In person appointment or virtual visit in the past 12 mos or appointment in next 3 mos     Recent Outpatient Visits              6 days ago Cough, unspecified type    [facility] PRECIOUS [place]    Office Visit    1 week ago Environmental and seasonal allergies    [facility], [address]    Nurse Only    1 week ago Allergic rhinitis due to pollen, unspecified seasonality    [facility], [address] Cea, MD    [employer] E/M    1 month ago Environmental and seasonal allergies    [facility], [address]    Nurse Only    2 months ago Environmental and seasonal allergies    [facility], [address]    Nurse Only          Future Appointments Provider Department Appt Notes    In 2 weeks [address], [address] [place], [place] Allergy Injection    In 1 month [address], [address] [place], [place] [facility]    In 2 months [address] [facility] [address], [address] [place], [place] Allergy Injection                 TRUEplus Lancets 30G Does not apply Misc 100 each 3     Sig: Check blood sugar daily       Diabetic Supplies Protocol Passed - 1/9/2023  5:39 PM        Passed - In person appointment or virtual visit in the past 12 mos or appointment in next 3 mos     Recent Outpatient Visits              6 days ago Cough, unspecified type    [facility] PRECIOUS [place]    Office Visit    1 week ago Environmental and seasonal allergies    [address], [facility] [address]    Nurse Only    1 week ago Allergic rhinitis due to pollen, unspecified seasonality    [address], [facility] Prest[facility], MD    [employer] E/M    1 month ago Environmental and seasonal allergies    [address], [address] [facility]    Nurse Only    2 months ago Environmental and seasonal allergies    [address] [place], [address] [facility]    Nurse Only          Future Appointments         Provider Department Appt Notes    In 2 weeks [address], [address] [place], [place] [facility] In[facility]    In 1 month [address] [facility] [address], [address] [place], [place] [facility] Injection    In 2 months [address] [facility], [address] [facility] Allergy Injection                    Recent Outpatient Visits              6 days ago Cough, unspecified type    [facility] PRECIOUS Garcia    Office Visit    1 week ago Environmental and seasonal allergies    [address], [address] [facility]    Nurse Only    1 week ago Allergic rhinitis due to pollen, unspecified seasonality    Aaron Russell MD    Virtual Phone E/M    1 month ago Environmental and seasonal allergies    [facility], [facility]    Nurse Only    2 months ago Environmental and seasonal allergies    [facility], [address] [facility]    Nurse Only            Future Appointments         Provider Department Appt Notes    In 2 weeks [address] [facility] [place], [address] [facility] Allergy Injection    In 1 month [address], [address] Allergy Injection    In 2 months [address], [address] Allergy Injection

## 2023-01-10 NOTE — TELEPHONE ENCOUNTER
Refill passed per [address] [place] protocol. Requested Prescriptions   Pending Prescriptions Disp Refills    glyBURIDE-metFORMIN 2.5-500 MG Oral Tab 90 tablet 1     Sig: Take 1 tablet by mouth daily with breakfast.       Diabetes Medication Protocol Failed - 2023  5:39 PM        Failed - In person appointment or virtual visit in the past 6 mos or appointment in next 3 mos     Recent Outpatient Visits              6 days ago Cough, unspecified type    [facility] PRECIOUS [place]    Office Visit    1 week ago Environmental and seasonal allergies    [address], [address] [place], [place]    Nurse Only    1 week ago Allergic rhinitis due to pollen, unspecified seasonality    [address], [address] Kristi Peasant, MD    [employer] E/M    1 month ago Environmental and seasonal allergies    [address] [place], [address] [place], [employer]    Nurse Only    2 months ago Environmental and seasonal allergies    [address], [address] [place], [employer]    Nurse Only          Future Appointments         Provider Department Appt Notes    In 2 weeks [address], [address] [place], [place] Allergy Injection    In 1 month [address], [address] [place], [place] Allergy Injection    In 2 months [address], [address] [place], [place] Allergy Injection               Passed - Last A1C < 7.5 and within past 6 months     Lab Results   Component Value Date    A1C 6.8 (H) 10/12/2022             Passed - EGFRCR or GFRNAA > 50     GFR Evaluation  GFRNAA: 103 , resulted on 2022          Passed - GFR in the past 12 months          Glucose Blood (TRUE METRIX BLOOD GLUCOSE TEST) In Vitro Strip 100 strip 3     Si strip by In Vitro route daily.        Diabetic Supplies Protocol Passed - 2023  5:39 PM        Passed - In person appointment or virtual visit in the past 12 mos or appointment in next 3 mos     Recent Outpatient Visits 6 days ago Cough, unspecified type    [facility]    Office Visit    1 week ago Environmental and seasonal allergies    [facility], [address] [facility]    Nurse Only    1 week ago Allergic rhinitis due to pollen, unspecified seasonality    [facility], [address] Ralph Fragmin, MD    [employer] E/M    1 month ago Environmental and seasonal allergies    [facility], [address] [facility]    Nurse Only    2 months ago Environmental and seasonal allergies    [facility], [address] [facility]    Nurse Only          Future Appointments         Provider Department Appt Notes    In 2 weeks [address], [address] [place] [facility]ergy Injection    In 1 month [address], [address] [place] [facility] Injection    In 2 months [facility], [place] [facility]                 dicyclomine 20 MG Oral Tab 270 tablet 1     Sig: Take 1 tablet (20 mg total) by mouth 3 (three) times daily.        Gastrointestional Medication Protocol Passed - 1/9/2023  5:39 PM        Passed - In person appointment or virtual visit in the past 12 mos or appointment in next 3 mos     Recent Outpatient Visits              6 days ago Cough, unspecified type    [facility] [place]    Office Visit    1 week ago Environmental and seasonal allergies    [facility], [address] [facility]    Nurse Only    1 week ago Allergic rhinitis due to pollen, unspecified seasonality    [facility], [address] Ralph Fragmin, MD    [employer] E/M    1 month ago Environmental and seasonal allergies    [facility], [address] [facility]    Nurse Only    2 months ago Environmental and seasonal allergies    [facility], [address] [place] [address]    Nurse Only          Future Appointments         Provider Department Appt Notes    In 2 weeks [facility], [address] [place], [place] [facility]    In 1 month [address], [address] [place] [facility]    In 2 months [address], [address] [place] [facility] Injection                 TRUEplus Lancets 30G Does not apply Misc 100 each 3     Sig: Check blood sugar daily       Diabetic Supplies Protocol Passed - 1/9/2023  5:39 PM        Passed - In person appointment or virtual visit in the past 12 mos or appointment in next 3 mos     Recent Outpatient Visits              6 days ago Cough, unspecified type    [facility] PRECIOUS [place]    Office Visit    1 week ago Environmental and seasonal allergies    [facility], [address]    Nurse Only    1 week ago Allergic rhinitis due to pollen, unspecified seasonality    [facility], [address] Brigido Peel, MD    [employer] E/M    1 month ago Environmental and seasonal allergies    [facility], [address]    Nurse Only    2 months ago Environmental and seasonal allergies    [facility], [address] [place]    Nurse Only          Future Appointments         Provider Department Appt Notes    In 2 weeks [address], [address] [place] [facility] Injection    In 1 month [address], [address] [place] Allergy Injection    In 2 months [address] [facility] [address], [address] [place] Allergy Injection                    Recent Outpatient Visits              6 days ago Cough, unspecified type    [facility] PRECIOUS [place]    Office Visit    1 week ago Environmental and seasonal allergies    [facility], [address]    Nurse Only    1 week ago Allergic rhinitis due to pollen, unspecified seasonality    Teresita Byrd MD    [employer] E/M    1 month ago Environmental and seasonal allergies    [facility], [address]    Nurse Only    2 months ago Environmental and seasonal allergies    [facility], [address]    Nurse Only            Future Appointments         Provider Department Appt Notes    In 2 weeks [address], [address] Allergy Injection    In 1 month [address] [facility] [address], [address] Allergy Injection    In 2 months [address] [facility] [address], [address] Allergy Injection

## 2023-01-10 NOTE — TELEPHONE ENCOUNTER
Pt states he only needs the lancets sent to [facility] at this time. Pt states he still has glyburide-metformin. Informed Pt l[facility]ets were [facility].  Pt verbalized understanding.

## 2023-01-16 ENCOUNTER — OFFICE VISIT (OUTPATIENT)
Dept: FAMILY MEDICINE CLINIC | Facility: CLINIC | Age: 63
End: 2023-01-16
Payer: COMMERCIAL

## 2023-01-16 VITALS
HEIGHT: 68 IN | WEIGHT: 150 LBS | SYSTOLIC BLOOD PRESSURE: 119 MMHG | RESPIRATION RATE: 18 BRPM | OXYGEN SATURATION: 97 % | DIASTOLIC BLOOD PRESSURE: 83 MMHG | BODY MASS INDEX: 22.73 KG/M2 | HEART RATE: 72 BPM | TEMPERATURE: 98 F

## 2023-01-16 DIAGNOSIS — J20.9 ACUTE BRONCHITIS, UNSPECIFIED ORGANISM: ICD-10-CM

## 2023-01-16 DIAGNOSIS — J31.0 RHINOSINUSITIS: Primary | ICD-10-CM

## 2023-01-16 DIAGNOSIS — J32.9 RHINOSINUSITIS: Primary | ICD-10-CM

## 2023-01-16 RX ORDER — BENZONATATE 200 MG/1
CAPSULE ORAL
Qty: 20 CAPSULE | Refills: 0 | Status: SHIPPED | OUTPATIENT
Start: 2023-01-16

## 2023-01-16 RX ORDER — AMOXICILLIN AND CLAVULANATE POTASSIUM 875; 125 MG/1; MG/1
1 TABLET, FILM COATED ORAL 2 TIMES DAILY
Qty: 20 TABLET | Refills: 0 | Status: SHIPPED | OUTPATIENT
Start: 2023-01-16 | End: 2023-01-26

## 2023-01-23 ENCOUNTER — OFFICE VISIT (OUTPATIENT)
Dept: FAMILY MEDICINE CLINIC | Facility: CLINIC | Age: 63
End: 2023-01-23
Payer: COMMERCIAL

## 2023-01-23 VITALS
SYSTOLIC BLOOD PRESSURE: 120 MMHG | BODY MASS INDEX: 22.73 KG/M2 | OXYGEN SATURATION: 98 % | WEIGHT: 150 LBS | RESPIRATION RATE: 18 BRPM | HEART RATE: 77 BPM | DIASTOLIC BLOOD PRESSURE: 85 MMHG | HEIGHT: 68 IN | TEMPERATURE: 98 F

## 2023-01-23 DIAGNOSIS — H93.292 ABNORMAL AUDITORY PERCEPTION OF LEFT EAR: Primary | ICD-10-CM

## 2023-01-23 DIAGNOSIS — R05.1 ACUTE COUGH: ICD-10-CM

## 2023-01-23 RX ORDER — BENZONATATE 200 MG/1
200 CAPSULE ORAL 3 TIMES DAILY PRN
Qty: 30 CAPSULE | Refills: 0 | Status: SHIPPED | OUTPATIENT
Start: 2023-01-23

## 2023-01-23 NOTE — PATIENT INSTRUCTIONS
Monitor ear symptoms. Continue flonase for possible eustachian tube contribution to your symptoms. If no better in 1 week follow-up with PCP for further evaluation. Continue benzonatate as needed. If cough continues despite finishing sinusitis treatment you should follow-up with your PCP for evaluation.

## 2023-01-30 ENCOUNTER — OFFICE VISIT (OUTPATIENT)
Dept: FAMILY MEDICINE CLINIC | Facility: CLINIC | Age: 63
End: 2023-01-30
Payer: COMMERCIAL

## 2023-01-30 VITALS
HEIGHT: 68 IN | OXYGEN SATURATION: 96 % | WEIGHT: 150 LBS | BODY MASS INDEX: 22.73 KG/M2 | TEMPERATURE: 98 F | HEART RATE: 72 BPM | SYSTOLIC BLOOD PRESSURE: 120 MMHG | DIASTOLIC BLOOD PRESSURE: 84 MMHG | RESPIRATION RATE: 18 BRPM

## 2023-01-30 DIAGNOSIS — H69.82 EUSTACHIAN TUBE DYSFUNCTION, LEFT: Primary | ICD-10-CM

## 2023-01-30 PROCEDURE — 99213 OFFICE O/P EST LOW 20 MIN: CPT | Performed by: NURSE PRACTITIONER

## 2023-01-30 PROCEDURE — 3074F SYST BP LT 130 MM HG: CPT | Performed by: NURSE PRACTITIONER

## 2023-01-30 PROCEDURE — 3079F DIAST BP 80-89 MM HG: CPT | Performed by: NURSE PRACTITIONER

## 2023-01-30 PROCEDURE — 3008F BODY MASS INDEX DOCD: CPT | Performed by: NURSE PRACTITIONER

## 2023-03-03 RX ORDER — GLYBURIDE-METFORMIN HYDROCHLORIDE 2.5; 5 MG/1; MG/1
1 TABLET ORAL
Qty: 90 TABLET | Refills: 0 | OUTPATIENT
Start: 2023-03-03

## 2023-03-04 NOTE — TELEPHONE ENCOUNTER
Duplicate request, previously addressed. Medication Quantity Refills Start End   glyBURIDE-metFORMIN 2.5-500 MG Oral Tab 90 tablet 0 1/10/2023    Sig:   Take 1 tablet by mouth daily with breakfast.     Route:   Oral     Note to Pharmacy:   90 day refill given on 01/10/23, appointment needed for further refills.      Order #:   [id number]

## 2023-04-06 NOTE — TELEPHONE ENCOUNTER
Chart updated Paper ordeer sent from [facility] for complete pft clinic number [phone]

## 2023-07-13 RX ORDER — GLUCOSAM/CHON-MSM1/C/MANG/BOSW 500-416.6
TABLET ORAL
Qty: 100 EACH | Refills: 3 | Status: SHIPPED | OUTPATIENT
Start: 2023-07-13

## 2023-09-01 ENCOUNTER — TELEPHONE (OUTPATIENT)
Dept: FAMILY MEDICINE CLINIC | Facility: CLINIC | Age: 63
End: 2023-09-01

## 2023-09-22 ENCOUNTER — PATIENT OUTREACH (OUTPATIENT)
Dept: CASE MANAGEMENT | Age: 63
End: 2023-09-22

## 2023-09-22 NOTE — PROCEDURES
The office order for PCP removal request is Approved and finalized on September 22, 2023.     Thanks,  [facility] [employer]

## 2023-09-26 ENCOUNTER — TELEPHONE (OUTPATIENT)
Dept: ALLERGY | Facility: CLINIC | Age: 63
End: 2023-09-26

## 2024-02-05 DIAGNOSIS — E11.9 CONTROLLED TYPE 2 DIABETES MELLITUS WITHOUT COMPLICATION, WITHOUT LONG-TERM CURRENT USE OF INSULIN (HCC): ICD-10-CM

## 2024-02-05 RX ORDER — CALCIUM CITRATE/VITAMIN D3 200MG-6.25
1 TABLET ORAL DAILY
Qty: 100 STRIP | Refills: 3 | Status: SHIPPED | OUTPATIENT
Start: 2024-02-05

## 2024-03-01 RX ORDER — DICYCLOMINE HCL 20 MG
20 TABLET ORAL 3 TIMES DAILY
Qty: 90 TABLET | Refills: 0 | Status: SHIPPED | OUTPATIENT
Start: 2024-03-01

## 2024-03-01 NOTE — TELEPHONE ENCOUNTER
Please review; protocol failed/No Protocol    LOV: 06/22/2022  Sent [facility] to schedule an appointment.     Requested Prescriptions   Pending Prescriptions Disp Refills    DICYCLOMINE 20 MG Oral Tab [Pharmacy Med Name: DICYCLOMINE 20MG TABLETS] 270 tablet 1     Sig: TAKE 1 TABLET(20 MG) BY MOUTH THREE TIMES DAILY       Gastrointestional Medication Protocol Failed - 2/29/2024  7:02 AM        Failed - In person appointment or virtual visit in the past 12 mos or appointment in next 3 mos     Recent Outpatient Visits              1 year ago Eustachian tube dysfunction, left    [facility] [place]    Office Visit    1 year ago Abnormal auditory perception of left ear    [facility], [place] PA-C    Office Visit    1 year ago Rhinosinusitis    [facility], [place] AP[place]    Office Visit    1 year ago Cough, unspecified type    [facility], [place] AP[place]    Office Visit    1 year ago Environmental and seasonal allergies    [facility]    Nurse Only                           Recent Outpatient Visits              1 year ago Eustachian tube dysfunction, left    [facility], [place] APRN    Office Visit    1 year ago Abnormal auditory perception of left ear    [facility], [place] PA-C    Office Visit    1 year ago Rhinosinusitis    [facility], [place] AP[place]    Office Visit    1 year ago Cough, unspecified type    [facility], [place] AP[place]    Office Visit    1 year ago  Environmental and seasonal allergies    [facility], [address], [place]    Nurse Only

## 2024-03-02 NOTE — TELEPHONE ENCOUNTER
Message noted: Chart reviewed and may refill medication as requested. Prescription sent to listed pharmacy. Pharmacy to notify patient. Pt notified through [employer]

## 2024-06-03 RX ORDER — DICYCLOMINE HCL 20 MG
20 TABLET ORAL 3 TIMES DAILY
Qty: 90 TABLET | Refills: 0 | OUTPATIENT
Start: 2024-06-03

## 2024-06-03 NOTE — TELEPHONE ENCOUNTER
MARGY: Talked to patient he states that he has a new PCP already, told him to call his pharmacy to update them his new PCP and he understood.

## 2024-06-03 NOTE — TELEPHONE ENCOUNTER
Please review. Protocol Failed; No Protocol  No future appointments.  [employer] message sent to patient to schedule an office visit with Provider and/or  Routed to Patient  for assistance with appointment.     Requested Prescriptions   Pending Prescriptions Disp Refills    DICYCLOMINE 20 MG Oral Tab [Pharmacy Med Name: DICYCLOMINE 20MG TABLETS] 90 tablet 0     Sig: TAKE 1 TABLET(20 MG) BY MOUTH THREE TIMES DAILY       Gastrointestional Medication Protocol Failed - 5/30/2024  8:17 AM        Failed - In person appointment or virtual visit in the past 12 mos or appointment in next 3 mos     Recent Outpatient Visits              1 year ago Eustachian tube dysfunction, left    [facility], [place]    Office Visit    1 year ago Abnormal auditory perception of left ear    [facility], [place] PA-C    Office Visit    1 year ago Rhinosinusitis    [facility], [place] AP[place]    Office Visit    1 year ago Cough, unspecified type    [facility], [place] AP[place]    Office Visit    1 year ago Environmental and seasonal allergies    [facility]    Nurse Only                               Recent Outpatient Visits              1 year ago Eustachian tube dysfunction, left    [facility], [facility], [place] APR[place]    Office Visit    1 year ago Abnormal auditory perception of left ear    [facility], [place] PA-C    Office Visit    1 year ago Rhinosinusitis    [facility], [place] AP[place]    Office Visit    1 year ago Cough, unspecified type     [facility] Myron Metz Jr., APN    Office Visit    1 year ago Environmental and seasonal allergies    [facility], [address], [place]    Nurse Only

## 2024-11-19 NOTE — TELEPHONE ENCOUNTER
Message noted: Chart reviewed and may refill medication as requested times one. Prescription sent to listed pharmacy. Pharmacy to notify patient. Self

## 2025-08-04 ENCOUNTER — TELEPHONE (OUTPATIENT)
Facility: CLINIC | Age: 65
End: 2025-08-04

## (undated) DIAGNOSIS — Z20.822 ENCOUNTER FOR SCREENING LABORATORY TESTING FOR COVID-19 VIRUS IN ASYMPTOMATIC PATIENT: Primary | ICD-10-CM

## (undated) DIAGNOSIS — Z00.00 ROUTINE PHYSICAL EXAMINATION: ICD-10-CM

## (undated) DIAGNOSIS — E11.9 CONTROLLED TYPE 2 DIABETES MELLITUS WITHOUT COMPLICATION, WITHOUT LONG-TERM CURRENT USE OF INSULIN (HCC): Primary | ICD-10-CM

## (undated) NOTE — MR AVS SNAPSHOT
[facility] - [place] [address] [address] [id number] [phone]               Thank you for choosing us for your health care visit with Laurel Grewal MD.  We are glad to serve you and happy to provide you with this summary of y 115/80 mmHg 70 98.6 °F (37 °C) (Oral) 5' 8\" (1.727 m) 156 lb (70.761 kg) 23.73 kg/m2         Current Medications          This list is accurate as of: 3/1/17 10:56 AM.  Always use your most recent med list.                Dicyclomine HCl 20 MG Tabs   SANTOS

## (undated) NOTE — MR AVS SNAPSHOT
[facility] SPECIALTY hospitals - [place] [address] [address] [id number] [phone]               Thank you for choosing us for your health care visit with Rodolfo Zambrano MD.  We are glad to serve you and happy to provide you with this summary of y Generic drug:  Influenza Vac Split Quad           Fluticasone Propionate 50 MCG/ACT Susp   USE 2 SPRAYS IN EACH NOSTRIL DAILY   Commonly known as:  FLONASE           GlyBURIDE-MetFORMIN 2.5-500 MG Tabs   TAKE 1 TABLET BY MOUTH DAILY WITH BREAKFAST   Common Toney[employer]tn

## (undated) NOTE — MR AVS SNAPSHOT
[place]  [address]  [phone]               Thank you for choosing us for your health care visit with Ilir Bangura MD.  We are glad to serve you and happy to provide you with this summary 1 kit by Does not apply route daily. TRUEPLUS LANCETS 28G Misc   USE AS DIRECTED ONCE DAILY           TYLENOL EXTRA STRENGTH OR   Take by mouth.                    MyChart     Visit MyChart  You can access your MyChart to more actively manage your

## (undated) NOTE — LETTER
2/3/2020              Adelso Gene        [address] [facility] [address]         Dear Afia Fitting,    Our records indicate that the tests ordered for you by Altagracia Gautam PA-C  have not been done.   If you have, in fact, already completed the meme

## (undated) NOTE — LETTER
3/22/2018              [facility]        [address] [facility] [address]         Dear Cathy Haley,    Your prescription for Dicyclomine was not refilled at this time. You have not been seen in the office in over a year.  In order to receive

## (undated) NOTE — LETTER
7/29/2020          To Whom It May Concern:    Cara Stark is currently under my medical care.   Please be advised that due to the patient's recent illness and the COVID 19 pandemic, it would be advisable for the patient to self-isolate at home during [facility]

## (undated) NOTE — Clinical Note
Wendy Akbar Md  [address] [place] [address], [address]       01/09/2017        Patient: Cara Stark   YOB: 1960   Date of Visit: 1/9/2017       Dear  Dr. Sal Campbell MD,      Thank you for referring Cara Stark to my practice.   Ple

## (undated) NOTE — ED AVS SNAPSHOT
Samuel Angeles   MRN: [id number]    Department:  [facility] Emergency Department   Date of Visit:  1/24/2020           Disclosure     Insurance plans vary and the physician(s) referred by the ER may not be covered by your plan.  Please contact y CARE PHYSICIAN AT ONCE OR RETURN IMMEDIATELY TO THE EMERGENCY DEPARTMENT. If you have been prescribed any medication(s), please fill your prescription right away and begin taking the medication(s) as directed.   If you believe that any of the medications

## (undated) NOTE — LETTER
Patient Name: Barbara Cleary  : 1960  MRN: [id number]  Patient Address: [address] [place] [address] [address] [address]      Coronavirus Disease 2019 (COVID-19)     [facility] is committed to the safety and well-being of our patients, members, Nivia Closs 2. Monitor your symptoms carefully. If your symptoms get worse, call your healthcare provider immediately. 3. Get rest and stay hydrated.    4. If you have a medical appointment, call the healthcare provider ahead of time and tell them that you have or may ? At least 24 hours have passed since recovery defined as resolution of fever without the use of fever-reducing medications; and  · Improvement in respiratory symptoms (e.g., cough, shortness of breath); and  · At least 10 days have passed since symptoms f If you would be interested in donating your plasma to help treat others diagnosed with the virus, please contact [employer] directly on their website: [email or web address]    Who is eligible to donate convalescent plasma?

## (undated) NOTE — MR AVS SNAPSHOT
[place]  [address]  [phone]               Thank you for choosing us for your health care visit with Juana Bosch MD.  We are glad to serve you and happy to provide you with this summary USE AS DIRECTED ONCE DAILY           TYLENOL EXTRA STRENGTH OR   Take by mouth. Follow-up Instructions     Return if symptoms worsen or fail to improve.          MyChart     Visit [employer]  You can access your MyChart to more actively [employer]

## (undated) NOTE — MR AVS SNAPSHOT
[facility] [place] [address] [address] [id number] [phone]               Thank you for choosing us for your health care visit with Ar Mustafa MD.  We are glad to serve you and happy to provide you with this summary o TRUE METRIX METER w/Device Kit   1 kit by Does not apply route daily. TRUEPLUS LANCETS 28G Misc   USE AS DIRECTED ONCE DAILY           TYLENOL EXTRA STRENGTH OR   Take by mouth.                    MyChart     Visit MyChart  You can access your My